# Patient Record
Sex: FEMALE | Race: WHITE | Employment: FULL TIME | URBAN - METROPOLITAN AREA
[De-identification: names, ages, dates, MRNs, and addresses within clinical notes are randomized per-mention and may not be internally consistent; named-entity substitution may affect disease eponyms.]

---

## 2017-01-27 ENCOUNTER — TELEPHONE (OUTPATIENT)
Dept: FAMILY MEDICINE CLINIC | Age: 42
End: 2017-01-27

## 2017-01-27 RX ORDER — OSELTAMIVIR PHOSPHATE 75 MG/1
75 CAPSULE ORAL DAILY
Qty: 10 CAPSULE | Refills: 0 | Status: SHIPPED | OUTPATIENT
Start: 2017-01-27 | End: 2017-02-06

## 2017-03-02 ENCOUNTER — TELEPHONE (OUTPATIENT)
Dept: FAMILY MEDICINE CLINIC | Age: 42
End: 2017-03-02

## 2017-03-02 DIAGNOSIS — Z00.00 WELL ADULT ON ROUTINE HEALTH CHECK: Primary | ICD-10-CM

## 2017-03-04 LAB
ALBUMIN SERPL-MCNC: 4.4 G/DL (ref 3.2–5.3)
ALK PHOSPHATASE: 87 IU/L (ref 35–121)
ALT SERPL-CCNC: 23 IU/L (ref 5–59)
ANION GAP SERPL CALCULATED.3IONS-SCNC: 11 MMOL/L
AST SERPL-CCNC: 21 IU/L (ref 10–42)
BILIRUB SERPL-MCNC: 0.5 MG/DL (ref 0.2–1.3)
BUN BLDV-MCNC: 9 MG/DL (ref 10–20)
CALCIUM SERPL-MCNC: 9.8 MG/DL (ref 8.7–10.8)
CHLORIDE BLD-SCNC: 99 MMOL/L (ref 95–111)
CHOLESTEROL/HDL RATIO: 3.3
CHOLESTEROL: 193 MG/DL
CO2: 29 MMOL/L (ref 21–32)
CREAT SERPL-MCNC: 0.6 MG/DL (ref 0.5–1.3)
EGFR AFRICAN AMERICAN: 135
EGFR IF NONAFRICAN AMERICAN: 111
GLUCOSE: 86 MG/DL (ref 70–100)
HDLC SERPL-MCNC: 58 MG/DL (ref 40–60)
LDL CHOLESTEROL CALCULATED: 115 MG/DL
LDL/HDL RATIO: 2
POTASSIUM SERPL-SCNC: 3.9 MMOL/L (ref 3.5–5.4)
SODIUM BLD-SCNC: 135 MMOL/L (ref 134–147)
T4 FREE: 1.08 NG/DL (ref 0.8–1.8)
TOTAL PROTEIN: 7.1 G/DL (ref 5.8–8)
TRIGL SERPL-MCNC: 98 MG/DL
TSH SERPL DL<=0.05 MIU/L-ACNC: 0.99 UIU/ML (ref 0.4–4.4)
VLDLC SERPL CALC-MCNC: 20 MG/DL

## 2017-03-30 ENCOUNTER — OFFICE VISIT (OUTPATIENT)
Dept: FAMILY MEDICINE CLINIC | Age: 42
End: 2017-03-30

## 2017-03-30 VITALS
HEIGHT: 68 IN | WEIGHT: 293 LBS | TEMPERATURE: 98 F | SYSTOLIC BLOOD PRESSURE: 128 MMHG | DIASTOLIC BLOOD PRESSURE: 76 MMHG | HEART RATE: 92 BPM | BODY MASS INDEX: 44.41 KG/M2 | RESPIRATION RATE: 18 BRPM

## 2017-03-30 DIAGNOSIS — H66.92 LEFT OTITIS MEDIA, UNSPECIFIED CHRONICITY, UNSPECIFIED OTITIS MEDIA TYPE: Primary | ICD-10-CM

## 2017-03-30 DIAGNOSIS — H92.02 OTALGIA, LEFT: ICD-10-CM

## 2017-03-30 DIAGNOSIS — J01.40 ACUTE NON-RECURRENT PANSINUSITIS: ICD-10-CM

## 2017-03-30 DIAGNOSIS — F17.200 TOBACCO DEPENDENCE: ICD-10-CM

## 2017-03-30 PROCEDURE — 99213 OFFICE O/P EST LOW 20 MIN: CPT | Performed by: NURSE PRACTITIONER

## 2017-03-30 RX ORDER — OFLOXACIN 3 MG/ML
5 SOLUTION AURICULAR (OTIC) 2 TIMES DAILY
Qty: 1 BOTTLE | Refills: 0 | Status: SHIPPED | OUTPATIENT
Start: 2017-03-30 | End: 2017-04-06

## 2017-03-30 RX ORDER — AZITHROMYCIN 250 MG/1
TABLET, FILM COATED ORAL
Qty: 6 TABLET | Refills: 0 | Status: SHIPPED | OUTPATIENT
Start: 2017-03-30 | End: 2017-04-09

## 2017-03-30 ASSESSMENT — ENCOUNTER SYMPTOMS
TROUBLE SWALLOWING: 0
COUGH: 0
SORE THROAT: 1
PHOTOPHOBIA: 0
CONSTIPATION: 0
WHEEZING: 0
APNEA: 0
VOMITING: 0
ANAL BLEEDING: 0
ABDOMINAL PAIN: 0
SINUS PRESSURE: 0
VOICE CHANGE: 0
BACK PAIN: 0
BLOOD IN STOOL: 0
SHORTNESS OF BREATH: 0
DIARRHEA: 0
NAUSEA: 0
RHINORRHEA: 0
ABDOMINAL DISTENTION: 0

## 2017-05-05 ENCOUNTER — TELEPHONE (OUTPATIENT)
Dept: FAMILY MEDICINE CLINIC | Age: 42
End: 2017-05-05

## 2017-05-05 DIAGNOSIS — H92.02 OTALGIA, LEFT: Primary | ICD-10-CM

## 2017-05-05 RX ORDER — AMOXICILLIN 500 MG/1
CAPSULE ORAL
Qty: 42 CAPSULE | Refills: 0 | Status: SHIPPED | OUTPATIENT
Start: 2017-05-05 | End: 2017-05-30 | Stop reason: ALTCHOICE

## 2017-05-08 ENCOUNTER — TELEPHONE (OUTPATIENT)
Dept: FAMILY MEDICINE CLINIC | Age: 42
End: 2017-05-08

## 2017-05-08 DIAGNOSIS — H92.02 EAR PAIN, LEFT: Primary | ICD-10-CM

## 2017-05-09 ENCOUNTER — OFFICE VISIT (OUTPATIENT)
Dept: OTOLARYNGOLOGY | Age: 42
End: 2017-05-09

## 2017-05-09 VITALS
WEIGHT: 282.5 LBS | RESPIRATION RATE: 16 BRPM | HEIGHT: 68 IN | TEMPERATURE: 97.6 F | BODY MASS INDEX: 42.81 KG/M2 | SYSTOLIC BLOOD PRESSURE: 110 MMHG | DIASTOLIC BLOOD PRESSURE: 80 MMHG | HEART RATE: 76 BPM

## 2017-05-09 DIAGNOSIS — S03.00XA TMJ (DISLOCATION OF TEMPOROMANDIBULAR JOINT), INITIAL ENCOUNTER: Primary | ICD-10-CM

## 2017-05-09 DIAGNOSIS — H92.02 OTALGIA OF LEFT EAR: ICD-10-CM

## 2017-05-09 PROCEDURE — 99203 OFFICE O/P NEW LOW 30 MIN: CPT | Performed by: PHYSICIAN ASSISTANT

## 2017-05-09 RX ORDER — IBUPROFEN 800 MG/1
800 TABLET ORAL EVERY 6 HOURS PRN
Qty: 120 TABLET | Refills: 3 | Status: SHIPPED | OUTPATIENT
Start: 2017-05-09 | End: 2017-10-01 | Stop reason: ALTCHOICE

## 2017-05-09 ASSESSMENT — ENCOUNTER SYMPTOMS
COLOR CHANGE: 0
SORE THROAT: 0
VOICE CHANGE: 0
APNEA: 0
BACK PAIN: 0
COUGH: 0
EYE DISCHARGE: 0
NAUSEA: 0
CHEST TIGHTNESS: 0
FACIAL SWELLING: 0
EYE ITCHING: 0
BLOOD IN STOOL: 0
WHEEZING: 0
RECTAL PAIN: 0
CONSTIPATION: 0
ABDOMINAL DISTENTION: 0
VOMITING: 0
DIARRHEA: 0
SHORTNESS OF BREATH: 0
EYE PAIN: 0
EYE REDNESS: 0
CHOKING: 0
RHINORRHEA: 0
SINUS PRESSURE: 0
STRIDOR: 0
ABDOMINAL PAIN: 0
ANAL BLEEDING: 0
TROUBLE SWALLOWING: 0
PHOTOPHOBIA: 0

## 2017-05-30 ENCOUNTER — OFFICE VISIT (OUTPATIENT)
Dept: OTOLARYNGOLOGY | Age: 42
End: 2017-05-30

## 2017-05-30 VITALS
SYSTOLIC BLOOD PRESSURE: 120 MMHG | DIASTOLIC BLOOD PRESSURE: 80 MMHG | RESPIRATION RATE: 16 BRPM | WEIGHT: 282.4 LBS | BODY MASS INDEX: 42.8 KG/M2 | TEMPERATURE: 98.5 F | HEIGHT: 68 IN | HEART RATE: 80 BPM

## 2017-05-30 DIAGNOSIS — H93.8X3 EAR FULLNESS, BILATERAL: Primary | ICD-10-CM

## 2017-05-30 DIAGNOSIS — H93.13 TINNITUS, BILATERAL: ICD-10-CM

## 2017-05-30 DIAGNOSIS — R09.81 NASAL CONGESTION: ICD-10-CM

## 2017-05-30 DIAGNOSIS — S03.00XA TMJ (DISLOCATION OF TEMPOROMANDIBULAR JOINT), INITIAL ENCOUNTER: ICD-10-CM

## 2017-05-30 DIAGNOSIS — J01.00 ACUTE NON-RECURRENT MAXILLARY SINUSITIS: ICD-10-CM

## 2017-05-30 DIAGNOSIS — H92.02 OTALGIA OF LEFT EAR: ICD-10-CM

## 2017-05-30 PROCEDURE — 99214 OFFICE O/P EST MOD 30 MIN: CPT | Performed by: PHYSICIAN ASSISTANT

## 2017-05-30 RX ORDER — FLUTICASONE PROPIONATE 50 MCG
1 SPRAY, SUSPENSION (ML) NASAL DAILY
Qty: 1 BOTTLE | Refills: 3 | Status: SHIPPED | OUTPATIENT
Start: 2017-05-30 | End: 2018-02-02

## 2017-05-30 RX ORDER — AMOXICILLIN AND CLAVULANATE POTASSIUM 875; 125 MG/1; MG/1
1 TABLET, FILM COATED ORAL 2 TIMES DAILY
Qty: 56 TABLET | Refills: 0 | Status: SHIPPED | OUTPATIENT
Start: 2017-05-30 | End: 2017-06-26 | Stop reason: ALTCHOICE

## 2017-05-30 RX ORDER — LORATADINE 10 MG/1
10 TABLET ORAL DAILY
Qty: 1 TABLET | Refills: 3 | Status: SHIPPED | OUTPATIENT
Start: 2017-05-30 | End: 2019-08-26 | Stop reason: SDUPTHER

## 2017-05-30 ASSESSMENT — ENCOUNTER SYMPTOMS
CHOKING: 0
SHORTNESS OF BREATH: 0
ABDOMINAL PAIN: 0
WHEEZING: 0
SINUS PRESSURE: 1
EYE REDNESS: 0
VOICE CHANGE: 0
SORE THROAT: 0
EYE PAIN: 0
COLOR CHANGE: 0
CHEST TIGHTNESS: 0
TROUBLE SWALLOWING: 0
COUGH: 0
RHINORRHEA: 0
CONSTIPATION: 0
VOMITING: 0
BLOOD IN STOOL: 0
STRIDOR: 0
DIARRHEA: 0
EYE DISCHARGE: 0
EYE ITCHING: 0
ANAL BLEEDING: 0
ABDOMINAL DISTENTION: 0
NAUSEA: 0
RECTAL PAIN: 0
APNEA: 0
PHOTOPHOBIA: 0
BACK PAIN: 0
FACIAL SWELLING: 0

## 2017-06-16 ENCOUNTER — TELEPHONE (OUTPATIENT)
Dept: FAMILY MEDICINE CLINIC | Age: 42
End: 2017-06-16

## 2017-06-16 RX ORDER — FLUCONAZOLE 150 MG/1
TABLET ORAL
Qty: 2 TABLET | Refills: 0 | Status: SHIPPED | OUTPATIENT
Start: 2017-06-16 | End: 2017-06-17

## 2017-06-26 ENCOUNTER — OFFICE VISIT (OUTPATIENT)
Dept: AUDIOLOGY | Age: 42
End: 2017-06-26

## 2017-06-26 ENCOUNTER — OFFICE VISIT (OUTPATIENT)
Dept: OTOLARYNGOLOGY | Age: 42
End: 2017-06-26

## 2017-06-26 VITALS
DIASTOLIC BLOOD PRESSURE: 86 MMHG | TEMPERATURE: 98 F | WEIGHT: 284.9 LBS | HEART RATE: 72 BPM | BODY MASS INDEX: 43.18 KG/M2 | RESPIRATION RATE: 16 BRPM | SYSTOLIC BLOOD PRESSURE: 122 MMHG | HEIGHT: 68 IN

## 2017-06-26 DIAGNOSIS — H93.8X3 EAR FULLNESS, BILATERAL: Primary | ICD-10-CM

## 2017-06-26 DIAGNOSIS — J30.1 SEASONAL ALLERGIC RHINITIS DUE TO POLLEN: Primary | ICD-10-CM

## 2017-06-26 PROCEDURE — 99213 OFFICE O/P EST LOW 20 MIN: CPT | Performed by: NURSE PRACTITIONER

## 2017-06-26 RX ORDER — AZELASTINE HYDROCHLORIDE, FLUTICASONE PROPIONATE 137; 50 UG/1; UG/1
1 SPRAY, METERED NASAL 2 TIMES DAILY
Qty: 1 BOTTLE | Refills: 5 | COMMUNITY
Start: 2017-06-26 | End: 2018-02-02

## 2017-06-26 ASSESSMENT — ENCOUNTER SYMPTOMS
DIARRHEA: 0
ANAL BLEEDING: 0
WHEEZING: 0
ABDOMINAL DISTENTION: 0
CHEST TIGHTNESS: 0
TROUBLE SWALLOWING: 0
SHORTNESS OF BREATH: 0
COUGH: 0
APNEA: 0
PHOTOPHOBIA: 0
EYE REDNESS: 0
STRIDOR: 0
EYE ITCHING: 0
EYE PAIN: 0
VOICE CHANGE: 0
FACIAL SWELLING: 0
EYE DISCHARGE: 0
BACK PAIN: 0
NAUSEA: 0
SORE THROAT: 0
CHOKING: 0
SINUS PRESSURE: 0
COLOR CHANGE: 0
ABDOMINAL PAIN: 0
BLOOD IN STOOL: 0
RECTAL PAIN: 0
RHINORRHEA: 0
CONSTIPATION: 0
VOMITING: 0

## 2017-07-13 ENCOUNTER — OFFICE VISIT (OUTPATIENT)
Dept: OTOLARYNGOLOGY | Age: 42
End: 2017-07-13

## 2017-07-13 VITALS
RESPIRATION RATE: 18 BRPM | HEART RATE: 84 BPM | TEMPERATURE: 97.7 F | HEIGHT: 68 IN | DIASTOLIC BLOOD PRESSURE: 76 MMHG | BODY MASS INDEX: 43.81 KG/M2 | WEIGHT: 289.1 LBS | SYSTOLIC BLOOD PRESSURE: 118 MMHG

## 2017-07-13 DIAGNOSIS — S03.00XA TMJ (DISLOCATION OF TEMPOROMANDIBULAR JOINT), INITIAL ENCOUNTER: ICD-10-CM

## 2017-07-13 DIAGNOSIS — H61.892 FOREIGN BODY SENSATION IN EAR CANAL, LEFT: Primary | ICD-10-CM

## 2017-07-13 DIAGNOSIS — H93.8X3 EAR FULLNESS, BILATERAL: ICD-10-CM

## 2017-07-13 PROCEDURE — 99213 OFFICE O/P EST LOW 20 MIN: CPT | Performed by: PHYSICIAN ASSISTANT

## 2017-07-13 RX ORDER — AZELASTINE 1 MG/ML
2 SPRAY, METERED NASAL 2 TIMES DAILY
Qty: 1 BOTTLE | Refills: 3 | Status: SHIPPED | OUTPATIENT
Start: 2017-07-13 | End: 2017-10-01

## 2017-07-13 RX ORDER — FLUTICASONE PROPIONATE 50 MCG
1 SPRAY, SUSPENSION (ML) NASAL DAILY
Qty: 1 BOTTLE | Refills: 3 | Status: SHIPPED | OUTPATIENT
Start: 2017-07-13 | End: 2018-02-02

## 2017-07-13 ASSESSMENT — ENCOUNTER SYMPTOMS
VOMITING: 0
RHINORRHEA: 0
SINUS PRESSURE: 0
CONSTIPATION: 0
WHEEZING: 0
BLOOD IN STOOL: 0
CHOKING: 0
EYE DISCHARGE: 0
EYE REDNESS: 0
STRIDOR: 0
EYE ITCHING: 0
TROUBLE SWALLOWING: 0
ANAL BLEEDING: 0
FACIAL SWELLING: 0
NAUSEA: 0
COUGH: 0
SHORTNESS OF BREATH: 0
ABDOMINAL DISTENTION: 0
ABDOMINAL PAIN: 0
COLOR CHANGE: 0
VOICE CHANGE: 0
DIARRHEA: 0
APNEA: 0
PHOTOPHOBIA: 0
RECTAL PAIN: 0
SORE THROAT: 0
BACK PAIN: 0
CHEST TIGHTNESS: 0
EYE PAIN: 0

## 2017-10-01 ENCOUNTER — HOSPITAL ENCOUNTER (EMERGENCY)
Age: 42
Discharge: HOME | End: 2017-10-01
Attending: FAMILY MEDICINE
Payer: COMMERCIAL

## 2017-10-01 ENCOUNTER — HOSPITAL ENCOUNTER (OUTPATIENT)
Dept: INTERVENTIONAL RADIOLOGY/VASCULAR | Age: 42
Discharge: HOME | End: 2017-10-01
Payer: COMMERCIAL

## 2017-10-01 VITALS
WEIGHT: 290 LBS | HEART RATE: 86 BPM | DIASTOLIC BLOOD PRESSURE: 88 MMHG | BODY MASS INDEX: 43.95 KG/M2 | SYSTOLIC BLOOD PRESSURE: 131 MMHG | TEMPERATURE: 97.8 F | RESPIRATION RATE: 18 BRPM | HEIGHT: 68 IN | OXYGEN SATURATION: 94 %

## 2017-10-01 DIAGNOSIS — I80.202: Primary | ICD-10-CM

## 2017-10-01 LAB
ANION GAP: 8 MEQ/L (ref 8–16)
APTT: 27.4 SECONDS (ref 22–38)
BASOPHILS # BLD: 0.9 % (ref 0–3)
BUN BLDV-MCNC: 10 MG/DL (ref 7–18)
CHLORIDE BLD-SCNC: 105 MEQ/L (ref 98–107)
CO2: 26 MEQ/L (ref 21–32)
CREAT SERPL-MCNC: 0.7 MG/DL (ref 0.6–1.1)
EOSINOPHILS RELATIVE PERCENT: 5.8 % (ref 0–4)
GFR, ESTIMATED: > 90 ML/MIN/1.73M2
GLUCOSE BLD-MCNC: 119 MG/DL (ref 74–106)
HCT VFR BLD CALC: 44.4 % (ref 37–47)
HEMOGLOBIN: 14.4 GM/DL (ref 12–16)
INR BLD: 1.04 (ref 0.85–1.13)
LYMPHOCYTES # BLD: 28 % (ref 15–47)
MCH RBC QN AUTO: 29.5 PG (ref 27–31)
MCHC RBC AUTO-ENTMCNC: 32.5 GM/DL (ref 33–37)
MCV RBC AUTO: 90.7 FL (ref 81–99)
MONOCYTES: 7.5 % (ref 0–12)
PDW BLD-RTO: 11.6 % (ref 11.5–14.5)
PLATELET # BLD: 270 THOU/MM3 (ref 130–400)
PMV BLD AUTO: 7.4 MCM (ref 7.4–10.4)
POTASSIUM SERPL-SCNC: 3.8 MEQ/L (ref 3.5–5.1)
RBC # BLD: 4.89 MILL/MM3 (ref 4.2–5.4)
RBC # BLD: NORMAL 10*6/UL
SEGS: 57.8 % (ref 43–75)
SODIUM BLD-SCNC: 139 MEQ/L (ref 136–145)
WBC # BLD: 9.3 THOU/MM3 (ref 4.8–10.8)

## 2017-10-01 PROCEDURE — 99284 EMERGENCY DEPT VISIT MOD MDM: CPT

## 2017-10-01 PROCEDURE — 82947 ASSAY GLUCOSE BLOOD QUANT: CPT

## 2017-10-01 PROCEDURE — 36415 COLL VENOUS BLD VENIPUNCTURE: CPT

## 2017-10-01 PROCEDURE — 82565 ASSAY OF CREATININE: CPT

## 2017-10-01 PROCEDURE — 80051 ELECTROLYTE PANEL: CPT

## 2017-10-01 PROCEDURE — 85610 PROTHROMBIN TIME: CPT

## 2017-10-01 PROCEDURE — 85025 COMPLETE CBC W/AUTO DIFF WBC: CPT

## 2017-10-01 PROCEDURE — 85730 THROMBOPLASTIN TIME PARTIAL: CPT

## 2017-10-01 PROCEDURE — 93971 EXTREMITY STUDY: CPT

## 2017-10-01 PROCEDURE — 84520 ASSAY OF UREA NITROGEN: CPT

## 2017-10-01 RX ORDER — NAPROXEN 500 MG/1
500 TABLET ORAL 2 TIMES DAILY PRN
Qty: 30 TABLET | Refills: 0 | Status: SHIPPED | OUTPATIENT
Start: 2017-10-01 | End: 2018-02-02

## 2017-10-01 ASSESSMENT — ENCOUNTER SYMPTOMS
PHOTOPHOBIA: 0
ABDOMINAL PAIN: 0
NAUSEA: 0
COLOR CHANGE: 0
STRIDOR: 0
FACIAL SWELLING: 0
BACK PAIN: 0
RHINORRHEA: 0
VOICE CHANGE: 0
EYE PAIN: 0
CONSTIPATION: 0
ABDOMINAL DISTENTION: 0
SHORTNESS OF BREATH: 0
SORE THROAT: 0
EYE REDNESS: 0
DIARRHEA: 0
CHEST TIGHTNESS: 0
EYE DISCHARGE: 0
WHEEZING: 0
VOMITING: 0
COUGH: 0

## 2017-10-01 ASSESSMENT — PAIN DESCRIPTION - LOCATION: LOCATION: LEG

## 2017-10-01 ASSESSMENT — PAIN DESCRIPTION - PAIN TYPE: TYPE: ACUTE PAIN

## 2017-10-01 ASSESSMENT — PAIN DESCRIPTION - DESCRIPTORS: DESCRIPTORS: BURNING;CONSTANT

## 2017-10-01 ASSESSMENT — PAIN DESCRIPTION - ORIENTATION: ORIENTATION: LEFT

## 2017-10-01 ASSESSMENT — PAIN SCALES - GENERAL: PAINLEVEL_OUTOF10: 5

## 2017-10-01 NOTE — ED NOTES
[facility]  ED called and notified that the patient will present to the ED to be registered for an outpatient vascular doppler ultrasound.      [address]  10/01/17 [id number]

## 2017-10-01 NOTE — ED AVS SNAPSHOT
After Visit Summary  (Discharge Instructions)    Medication List for Home    Based on the information you provided to us as well as any changes during this visit, the following is your updated medication list.  Compare this with your prescription bottles at home. If you have any questions or concerns, contact your primary care physician's office. Daily Medication List (This medication list can be shared with any Healthcare provider who is helping you manage your medications)      There are NEW medications for you. START taking them after you leave the hospital     naproxen 500 MG tablet   Commonly known as:  NAPROSYN   Take 1 tablet by mouth 2 times daily as needed for Pain         These are medications you told us you were taking at home, CONTINUE taking them after you leave the hospital     CLARITIN-D 12 HOUR PO   Take by mouth daily       FIBERCON PO   Take  by mouth daily. hydrocortisone 0.1 % Crea cream   Commonly known as:  LOCOID   Apply 2-3 x/day as needed to affected areas. MULTIVITAMIN PO   Take  by mouth daily. NONFORMULARY   Relora- 300 mg 1 tablet BID       SILYMARIN PO   Take 150 mg by mouth 2 times daily       spironolactone 100 MG tablet   Commonly known as:  ALDACTONE   Take 100 mg by mouth daily. VITAMIN B COMPLEX PO   Take  by mouth daily.          ASK your doctor about these medications if you have questions     azelastine 0.1 % nasal spray   Commonly known as:  ASTELIN   2 sprays by Nasal route 2 times daily Use in each nostril as directed       Azelastine-Fluticasone 137-50 MCG/ACT Susp   Commonly known as:  DYMISTA   1 spray by Nasal route 2 times daily       * fluticasone 50 MCG/ACT nasal spray   Commonly known as:  FLONASE   1 spray by Nasal route daily       * fluticasone 50 MCG/ACT nasal spray   Commonly known as:  FLONASE   1 spray by Nasal route daily       loratadine 10 MG tablet   Commonly known as:  CLARITIN   Take 1 tablet by mouth daily * Notice: This list has 2 medication(s) that are the same as other medications prescribed for you. Read the directions carefully, and ask your doctor or other care provider to review them with you. Where to Get Your Medications      You can get these medications from any pharmacy     Bring a paper prescription for each of these medications     naproxen 500 MG tablet               Allergies as of 10/1/2017        Reactions    Ceftin [Cefuroxime Axetil] Rash    HIGH DOSE CEFTIN ONLY    Codeine Nausea And Vomiting      Immunizations as of 10/1/2017     Name Date Dose VIS Date Route    Influenza Virus Vaccine 11/15/2016 0.5 -- --    Influenza Virus Vaccine 9/30/2015 0.5 mL 8/7/2015 Intramuscular    Influenza Virus Vaccine 12/2/2014 -- -- --    External: Patient reported    Comment: given at pharmacy    Influenza Virus Vaccine 12/5/2013 0.5 ML -- Intramuscular    External: Patient reported    Comment: GIVEN AT  [facility]    Influenza Virus Vaccine 11/1/2012 -- -- --    External: Patient reported    Influenza Whole 11/25/2011 -- -- --    External: Patient reported    Influenza, Triv, 3 years and older, IM 11/15/2016 -- -- --    Comment: [facility]    Td 1/1/1995 -- -- --    External: Patient reported    Comment: exact date unknown    Tdap (Boostrix, Adacel) 7/23/2012 0.5 mL 1/24/2012 Intramuscular         After Visit Summary    This summary was created for you. Thank you for entrusting your care to us.   The following information includes details about your hospital/visit stay along with steps you should take to help with your recovery once you leave the hospital.  In this packet, you will find information about the topics listed below:    · Instructions about your medications including a list of your home medications  · A summary of your hospital visit  · Follow-up appointments once you have left the hospital  · Your care plan at home You may receive a survey regarding the care you received during your stay. Your input is valuable to us. We encourage you to complete and return your survey in the envelope provided. We hope you will choose us in the future for your healthcare needs. Patient Information     Patient Name CARMNE Arana 1975      Care Provided at:     Name Address Phone       [address] [phone]            Your Visit    Here you will find information about your visit, including the reason for your visit. Please take this sheet with you when you visit your doctor or other health care provider in the future. It will help determine the best possible medical care for you at that time. If you have any questions once you leave the hospital, please call the department phone number listed below. Diagnoses this visit     Your diagnosis was PHLEBITIS AND THROMBOPHLEBITIS OF DEEP VEIN OF LOWER EXTREMITY, LEFT ([facility] [address] Visit Information     Date of Visit Department Dept Phone    10/1/2017 [facility] [phone]      You were seen by     You were seen by Dee Pelayo MD.       Follow-up Appointments    Below is a list of your follow-up and future appointments. This may not be a complete list as you may have made appointments directly with providers that we are not aware of or your providers may have made some for you. Please call your providers to confirm appointments. It is important to keep your appointments. Please bring your current insurance card, photo ID, co-pay, and all medication bottles to your appointment. If self-pay, payment is expected at the time of service. Follow-up Information     Follow up with Juni Wise MD In 2 days.     Specialty:  Family Medicine    Why:  If symptoms worsen, As needed    Contact information:    [address] [facility] [place] [address] [facility]        Date Due    HIV screening is recommended for all people regardless of risk factors  aged 15-65 years at least once (lifetime) who have never been HIV tested. 5/5/1992    Pap Smear 2/1/2016    Yearly Flu Vaccine (1) 9/1/2017    Cholesterol Screening 3/3/2022    Tetanus Combination Vaccine (3 - Td) 7/23/2022                 Care Plan Once You Return Home    This section includes instructions you will need to follow once you leave the hospital.  Your care team will discuss these with you, so you and those caring for you know how to best care for your health needs at home. This section may also include educational information about certain health topics that may be of help to you. Important Information if you smoke or are exposed to smoking       SMOKING: QUIT SMOKING. THIS IS THE MOST IMPORTANT ACTION YOU CAN TAKE TO IMPROVE YOUR CURRENT AND FUTURE HEALTH. Call the [facility] [place] NOW ([phone])    Smoking harms nonsmokers. When nonsmokers are around people who smoke, they absorb nicotine, carbon monoxide, and other ingredients of tobacco smoke. DO NOT SMOKE AROUND CHILDREN     Children exposed to secondhand smoke are at an increased risk of:  Sudden Infant Death Syndrome (SIDS), acute respiratory infections, inflammation of the middle ear, and severe asthma. Over a longer time, it causes heart disease and lung cancer. There is no safe level of exposure to secondhand smoke. Important information for a smoker       SMOKING: QUIT SMOKING. THIS IS THE MOST IMPORTANT ACTION YOU CAN TAKE TO IMPROVE YOUR CURRENT AND FUTURE HEALTH. Call the [facility] [place] NOW ([phone])    Smoking harms nonsmokers. When nonsmokers are around people who smoke, they absorb nicotine, carbon monoxide, and other ingredients of tobacco smoke.      DO NOT SMOKE AROUND CHILDREN Children exposed to secondhand smoke are at an increased risk of:  Sudden Infant Death Syndrome (SIDS), acute respiratory infections, inflammation of the middle ear, and severe asthma. Over a longer time, it causes heart disease and lung cancer. There is no safe level of exposure to secondhand smoke. MyChart Signup     Our records indicate that you have an active [employer] account. You can view your After Visit Summary by going to [email or web address] and logging in with your [employer] username and password. If you don't have a [employer] username and password but a parent or guardian has access to your record, the parent or guardian should login with their own [employer] username and password and access your record to view the After Visit Summary. Additional Information  If you have questions, please contact the physician practice where you receive care. Remember, D[employer] is NOT to be used for urgent needs. For medical emergencies, dial 911. For questions regarding your MyChart account call [phone]. If you have a clinical question, please call your doctor's office. View your information online  ? Review your current list of  medications, immunization, and allergies. ? Review your future test results online . ? Review your discharge instructions provided by your caregivers at discharge    Certain functionality such as prescription refills, scheduling appointments or sending messages to your provider are not activated if your provider does not use [employer] in his/her office    For questions regarding your MyChart account call [phone]. If you have a clinical question, please call your doctor's office. The information on all pages of the After Visit Summary has been reviewed with me, the patient and/or responsible adult, by my health care provider(s).  I had the opportunity to ask questions regarding this may cause increased clotting include:  · Having certain blood problems that make blood clot too easily. This is a problem that may run in families. · Having certain health problems, such as cancer, heart failure, stroke, or severe infection. · Being pregnant. A woman's risk of getting blood clots increases both during pregnancy and shortly after delivery or after a  section. · Using hormonal forms of birth control or hormone therapy. · Smoking. Injury to the blood vessel wall  Blood is more likely to clot in veins and arteries shortly after they are injured. Injury can be caused by a recent medical procedure or surgery that involved your legs, hips, belly, or brain. Or it can be caused by an injury, such as a broken hip. What can you do to prevent blood clots? After any procedure or event that increases your risk  · Take a blood-thinning medicine (called an anticoagulant) as directed if your doctor prescribes one. · Exercise your lower leg muscles to help keep the blood moving through your legs. Point your toes up toward your head so the calves of your legs are stretched, then relax. Repeat. This is a good exercise to do when you are sitting for long periods of time. · Get up out of bed as soon as you safely can or as soon as your doctor says it's okay after an illness or surgery. If you can't get out of bed, you can do the leg exercise described above. Try to do this leg exercise every hour when you are awake. This will help keep the blood moving through your legs. If you are in the hospital and need to stay in bed, your doctor may have you use a special device that inflates and deflates knee-high boots to help keep blood from pooling in your legs. · Use compression stockings if your doctor prescribes them. These are specially fitted stockings that may prevent blood clots by keeping blood from pooling in your legs.   When you travel license by [facility] ([facility]). If you have questions about a medical condition or this instruction, always ask your healthcare professional. [place] [address] any warranty or liability for your use of this information.

## 2017-10-01 NOTE — ED TRIAGE NOTES
Patient presents to the ED with complaints of left upper leg pain. Patient states that the pain started yesterday and she did not think anything of it. Patient denies any known trauma to the leg. States that the pain starts \"in a vein in the upper leg and radiates down the leg to the knee. 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 Patient denies SOB and chest pain at this time. Denies other complaints.

## 2017-10-01 NOTE — ED NOTES
Discharge teaching and instructions for condition explained to patient. AVS reviewed. Printed prescriptions given to patient. Patient voiced understanding regarding prescriptions, follow up appointments and care of self at home. Pt discharged to home in stable condition per self.        Iris Cole RN  10/01/17 4640

## 2017-10-01 NOTE — ED PROVIDER NOTES
[address] [employer]       Chief Complaint   Patient presents with    Leg Pain       Nurses Notes reviewed and I agree except as noted in the HPI. HISTORY OF PRESENT ILLNESS    Margaret Beebe is a 38 y.o. female who presents With left upper leg pain. Patient describes pain, and burning of the left upper leg. Patient does have some family history of blood clots and is concerned about the possibility of a leg blood clot. The patient denies any form of trauma. Patient is not on birth control pills. Patient does not have a history of hypercoagulability. She denies any alleviating measures. Pain is mild-to-moderate in intensity. REVIEW OF SYSTEMS     Review of Systems   Constitutional: Negative for appetite change, chills, diaphoresis and fever (Non toxic appearence). HENT: Negative for congestion, dental problem, ear pain, facial swelling, rhinorrhea, sore throat, tinnitus and voice change. Eyes: Negative for photophobia, pain, discharge and redness. Respiratory: Negative for cough, chest tightness, shortness of breath, wheezing and stridor. Cardiovascular: Negative for chest pain, palpitations and leg swelling. Gastrointestinal: Negative for abdominal distention, abdominal pain, constipation, diarrhea, nausea and vomiting. Genitourinary: Negative for urgency, vaginal bleeding and vaginal discharge. Musculoskeletal: Positive for myalgias (Left upper leg pain). Negative for arthralgias, back pain, joint swelling and neck stiffness. Skin: Negative for color change and rash. Neurological: Negative for dizziness, tremors, seizures, syncope, weakness, numbness and headaches. Psychiatric/Behavioral: Negative for agitation, hallucinations, sleep disturbance and suicidal ideas. The patient is not nervous/anxious. All other systems reviewed and are negative. PAST MEDICAL HISTORY    has a past medical history of Hypertension.     SURGICAL HISTORY has a past surgical history that includes Endometrial ablation (2007); Tubal ligation (2007); Dilation and curettage of uterus; Knee cartilage surgery (Right, 2/25/14); other surgical history (11/17/2016); and Hysterectomy. CURRENT MEDICATIONS       Previous Medications    AZELASTINE-FLUTICASONE (DYMISTA) 137-50 MCG/ACT SUSP    1 spray by Nasal route 2 times daily    B COMPLEX VITAMINS (VITAMIN B COMPLEX PO)    Take  by mouth daily. CALCIUM POLYCARBOPHIL (FIBERCON PO)    Take  by mouth daily. FLUTICASONE (FLONASE) 50 MCG/ACT NASAL SPRAY    1 spray by Nasal route daily    FLUTICASONE (FLONASE) 50 MCG/ACT NASAL SPRAY    1 spray by Nasal route daily    HYDROCORTISONE BUTYR LIPO BASE 0.1 % CREA    Apply 2-3 x/day as needed to affected areas. LORATADINE (CLARITIN) 10 MG TABLET    Take 1 tablet by mouth daily    LORATADINE-PSEUDOEPHEDRINE (CLARITIN-D 12 HOUR PO)      Take by mouth daily     MILK THISTLE-TURMERIC (SILYMARIN PO)    Take 150 mg by mouth 2 times daily    MULTIPLE VITAMIN (MULTIVITAMIN PO)    Take  by mouth daily. NONFORMULARY    Relora- 300 mg 1 tablet BID    SPIRONOLACTONE (ALDACTONE) 100 MG TABLET    Take 100 mg by mouth daily. ALLERGIES     is allergic to ceftin [cefuroxime axetil] and codeine. FAMILY HISTORY     indicated that her mother is alive. She indicated that the status of her father is unknown. She indicated that the status of her maternal grandmother is unknown. She indicated that the status of her maternal grandfather is unknown. She indicated that the status of her other is unknown. She indicated that the status of her neg hx is unknown.  family history includes Cancer in her maternal grandmother; Depression in her mother; Diabetes in her maternal grandfather and another family member; Heart Disease in her mother; High Blood Pressure in her mother; High Cholesterol in her father. There is no history of Stroke.     SOCIAL HISTORY      reports that she has been smoking Cigarettes. She has a 1.00 pack-year smoking history. She has never used smokeless tobacco. She reports that she does not drink alcohol or use illicit drugs. PHYSICAL EXAM     INITIAL VITALS:  height is 5' 8\" (1.727 m) and weight is 290 lb (131.5 kg). Her temperature is 97.8 °F (36.6 °C). Her blood pressure is 131/88 and her pulse is 86. Her respiration is 18 and oxygen saturation is 94%. Physical Exam   Constitutional: She appears well-developed and well-nourished. No distress. Musculoskeletal: Normal range of motion. She exhibits tenderness (Patient demonstrates tenderness to the mid thigh area. there is some tortuosity of the left thigh.). Skin: Skin is dry. No rash noted. Psychiatric: She has a normal mood and affect. Nursing note and vitals reviewed. DIFFERENTIAL DIAGNOSIS:   Phlebitis, DVT, muscle strain,    DIAGNOSTIC RESULTS     EKG: All EKG's are interpreted by the Emergency Department Physician who either signs or Co-signs this chart in the absence of a cardiologist.      RADIOLOGY: non-plain film images(s) such as CT, Ultrasound and MRI are read by the radiologist.  Plain radiographic images are visualized and preliminarily interpreted by the emergency physician unless otherwise stated below.       VL DUP LOWER EXTREMITY VENOUS LEFT (Final result) Result time: 10/01/17 21:44:14     Final result by Angélica Harkins DO (10/01/17 21:44:14)     Impression:       No evidence of a left lower extremity DVT. **This report has been created using voice recognition software. It may contain minor errors which are inherent in voice recognition technology. **    Final report electronically signed by Dr. Angélica Harkins on 10/1/2017 9:44 PM       Narrative:       PROCEDURE: VL LOWER EXTREMITY VENOUS LEFT    CLINICAL INFORMATION: dvt rule out, . COMPARISON: No prior study.     TECHNIQUE: Venous doppler ultrasound was performed of the bilateral lower extremities using gray scale, color flow

## 2018-02-02 ENCOUNTER — OFFICE VISIT (OUTPATIENT)
Dept: BARIATRICS/WEIGHT MGMT | Age: 43
End: 2018-02-02
Payer: COMMERCIAL

## 2018-02-02 VITALS
HEART RATE: 88 BPM | TEMPERATURE: 98.7 F | HEIGHT: 67 IN | BODY MASS INDEX: 45.99 KG/M2 | WEIGHT: 293 LBS | DIASTOLIC BLOOD PRESSURE: 80 MMHG | SYSTOLIC BLOOD PRESSURE: 138 MMHG | RESPIRATION RATE: 18 BRPM

## 2018-02-02 DIAGNOSIS — I10 HYPERTENSION, UNSPECIFIED TYPE: ICD-10-CM

## 2018-02-02 DIAGNOSIS — E66.01 MORBID OBESITY (HCC): Primary | ICD-10-CM

## 2018-02-02 PROCEDURE — G8427 DOCREV CUR MEDS BY ELIG CLIN: HCPCS | Performed by: SURGERY

## 2018-02-02 PROCEDURE — G8417 CALC BMI ABV UP PARAM F/U: HCPCS | Performed by: SURGERY

## 2018-02-02 PROCEDURE — G8484 FLU IMMUNIZE NO ADMIN: HCPCS | Performed by: SURGERY

## 2018-02-02 PROCEDURE — 1036F TOBACCO NON-USER: CPT | Performed by: SURGERY

## 2018-02-02 PROCEDURE — 99203 OFFICE O/P NEW LOW 30 MIN: CPT | Performed by: SURGERY

## 2018-02-02 ASSESSMENT — ENCOUNTER SYMPTOMS
EYES NEGATIVE: 1
RESPIRATORY NEGATIVE: 1
GASTROINTESTINAL NEGATIVE: 1
ALLERGIC/IMMUNOLOGIC NEGATIVE: 1

## 2018-02-02 NOTE — PROGRESS NOTES
on file     Other Topics Concern    Not on file     Social History Narrative    No narrative on file     /80 (Site: Right Arm, Position: Sitting, Cuff Size: Large Adult)   Pulse 88   Temp 98.7 °F (37.1 °C) (Oral)   Resp 18   Ht 5' 7\" (1.702 m)   Wt (!) 313 lb (142 kg)   BMI 49.02 kg/m²     Body mass index is 49.02 kg/m². waist 52 in neck 15 in    Objective:   Physical Exam   Constitutional: She is oriented to person, place, and time. She appears well-developed and well-nourished. She is active and cooperative. Non-toxic appearance. She does not appear ill. No distress. HENT:   Head: Normocephalic and atraumatic. Not macrocephalic and not microcephalic. Head is without raccoon's eyes, without Nieto's sign, without abrasion, without contusion, without laceration, without right periorbital erythema and without left periorbital erythema. Right Ear: External ear normal.   Left Ear: External ear normal.   Nose: Nose normal.   Mouth/Throat: Oropharynx is clear and moist and mucous membranes are normal. No oropharyngeal exudate. Eyes: Conjunctivae and EOM are normal. Pupils are equal, round, and reactive to light. Right eye exhibits no discharge. Left eye exhibits no discharge. No scleral icterus. Neck: Trachea normal, normal range of motion, full passive range of motion without pain and phonation normal. Neck supple. No JVD present. No tracheal tenderness present. No tracheal deviation present. No thyroid mass and no thyromegaly present. Cardiovascular: Normal rate, regular rhythm, S1 normal, S2 normal, normal heart sounds, intact distal pulses and normal pulses. Exam reveals no gallop. No murmur heard. Pulmonary/Chest: Effort normal and breath sounds normal. No stridor. No respiratory distress. She has no decreased breath sounds. She has no wheezes. She has no rales. She exhibits no tenderness and no deformity. Abdominal: Soft.  Bowel sounds are normal. She exhibits no distension, no fluid fitness/exercise discussed with patient and the need for this with/without surgery. 6.  Obtain medical necessity letter from PCP as needed. 7.  Follow-up in one month at weight management program at [facility]. 8.  Signs and symptoms reviewed with patient that would be concerning and need her to return to office for re-evaluation. Patient states she will call if she has questions or concerns. 9. Multivitamin  10. Psychology evaluation  11. EGD prior to any surgical intervention  12. Encouraged support groups  13. Encouraged naturally slam  15. Discussed the pros and cons with possible side effects weight loss medications. All questions answered. -- Patient states that if she is not able to lose enough adequate excess body weight with medical management only then she would like to proceed with a robotic sleeve gastrectomy. --More than 30 minutes spent with patient today. Greater than 50% of the time was involved with counseling, education and coordinating care.

## 2018-02-20 ENCOUNTER — TELEPHONE (OUTPATIENT)
Dept: BARIATRICS/WEIGHT MGMT | Age: 43
End: 2018-02-20

## 2018-05-25 ENCOUNTER — HOSPITAL ENCOUNTER (EMERGENCY)
Age: 43
Discharge: HOME | End: 2018-05-25
Attending: EMERGENCY MEDICINE
Payer: COMMERCIAL

## 2018-05-25 VITALS
WEIGHT: 293 LBS | TEMPERATURE: 98.1 F | DIASTOLIC BLOOD PRESSURE: 95 MMHG | HEIGHT: 68 IN | RESPIRATION RATE: 18 BRPM | SYSTOLIC BLOOD PRESSURE: 150 MMHG | HEART RATE: 104 BPM | BODY MASS INDEX: 44.41 KG/M2 | OXYGEN SATURATION: 98 %

## 2018-05-25 DIAGNOSIS — M54.2 NECK DISCOMFORT: Primary | ICD-10-CM

## 2018-05-25 PROCEDURE — 99282 EMERGENCY DEPT VISIT SF MDM: CPT

## 2018-05-25 ASSESSMENT — PAIN SCALES - GENERAL: PAINLEVEL_OUTOF10: 6

## 2018-05-25 ASSESSMENT — PAIN DESCRIPTION - ORIENTATION: ORIENTATION: RIGHT

## 2018-05-25 ASSESSMENT — PAIN DESCRIPTION - LOCATION: LOCATION: NECK

## 2018-07-02 ENCOUNTER — APPOINTMENT (OUTPATIENT)
Dept: GENERAL RADIOLOGY | Age: 43
End: 2018-07-02
Payer: COMMERCIAL

## 2018-07-02 ENCOUNTER — APPOINTMENT (OUTPATIENT)
Dept: CT IMAGING | Age: 43
End: 2018-07-02
Payer: COMMERCIAL

## 2018-07-02 ENCOUNTER — HOSPITAL ENCOUNTER (EMERGENCY)
Age: 43
Discharge: HOME | End: 2018-07-02
Attending: FAMILY MEDICINE
Payer: COMMERCIAL

## 2018-07-02 VITALS
WEIGHT: 293 LBS | BODY MASS INDEX: 44.41 KG/M2 | HEIGHT: 68 IN | SYSTOLIC BLOOD PRESSURE: 140 MMHG | RESPIRATION RATE: 16 BRPM | DIASTOLIC BLOOD PRESSURE: 72 MMHG | HEART RATE: 65 BPM | TEMPERATURE: 97.8 F | OXYGEN SATURATION: 98 %

## 2018-07-02 DIAGNOSIS — R51.9 NONINTRACTABLE EPISODIC HEADACHE, UNSPECIFIED HEADACHE TYPE: Primary | ICD-10-CM

## 2018-07-02 DIAGNOSIS — S82.891A CLOSED FRACTURE OF RIGHT ANKLE, INITIAL ENCOUNTER: ICD-10-CM

## 2018-07-02 PROCEDURE — 99284 EMERGENCY DEPT VISIT MOD MDM: CPT

## 2018-07-02 PROCEDURE — 96372 THER/PROPH/DIAG INJ SC/IM: CPT

## 2018-07-02 PROCEDURE — 70450 CT HEAD/BRAIN W/O DYE: CPT

## 2018-07-02 PROCEDURE — 96375 TX/PRO/DX INJ NEW DRUG ADDON: CPT

## 2018-07-02 PROCEDURE — 2580000003 HC RX 258: Performed by: FAMILY MEDICINE

## 2018-07-02 PROCEDURE — 96374 THER/PROPH/DIAG INJ IV PUSH: CPT

## 2018-07-02 PROCEDURE — 6360000002 HC RX W HCPCS: Performed by: FAMILY MEDICINE

## 2018-07-02 PROCEDURE — 73610 X-RAY EXAM OF ANKLE: CPT

## 2018-07-02 RX ORDER — METOCLOPRAMIDE HYDROCHLORIDE 5 MG/ML
10 INJECTION INTRAMUSCULAR; INTRAVENOUS ONCE
Status: COMPLETED | OUTPATIENT
Start: 2018-07-02 | End: 2018-07-02

## 2018-07-02 RX ORDER — KETOROLAC TROMETHAMINE 30 MG/ML
30 INJECTION, SOLUTION INTRAMUSCULAR; INTRAVENOUS ONCE
Status: COMPLETED | OUTPATIENT
Start: 2018-07-02 | End: 2018-07-02

## 2018-07-02 RX ORDER — SUMATRIPTAN 100 MG/1
100 TABLET, FILM COATED ORAL
Qty: 9 TABLET | Refills: 3 | Status: SHIPPED | OUTPATIENT
Start: 2018-07-02 | End: 2019-08-26 | Stop reason: SDUPTHER

## 2018-07-02 RX ORDER — SUMATRIPTAN 6 MG/.5ML
6 INJECTION, SOLUTION SUBCUTANEOUS ONCE
Status: COMPLETED | OUTPATIENT
Start: 2018-07-02 | End: 2018-07-02

## 2018-07-02 RX ORDER — NAPROXEN 500 MG/1
500 TABLET ORAL 2 TIMES DAILY
Qty: 60 TABLET | Refills: 0 | Status: SHIPPED | OUTPATIENT
Start: 2018-07-02 | End: 2018-10-01 | Stop reason: ALTCHOICE

## 2018-07-02 RX ORDER — CYCLOBENZAPRINE HCL 10 MG
10 TABLET ORAL 3 TIMES DAILY PRN
Qty: 30 TABLET | Refills: 0 | Status: SHIPPED | OUTPATIENT
Start: 2018-07-02 | End: 2018-07-12

## 2018-07-02 RX ORDER — 0.9 % SODIUM CHLORIDE 0.9 %
1000 INTRAVENOUS SOLUTION INTRAVENOUS ONCE
Status: COMPLETED | OUTPATIENT
Start: 2018-07-02 | End: 2018-07-02

## 2018-07-02 RX ADMIN — SODIUM CHLORIDE 1000 ML: 9 INJECTION, SOLUTION INTRAVENOUS at 12:08

## 2018-07-02 RX ADMIN — KETOROLAC TROMETHAMINE 30 MG: 30 INJECTION, SOLUTION INTRAMUSCULAR at 12:16

## 2018-07-02 RX ADMIN — SUMATRIPTAN 6 MG: 6 INJECTION, SOLUTION SUBCUTANEOUS at 12:15

## 2018-07-02 RX ADMIN — METOCLOPRAMIDE 10 MG: 5 INJECTION, SOLUTION INTRAMUSCULAR; INTRAVENOUS at 12:16

## 2018-07-02 ASSESSMENT — PAIN DESCRIPTION - PAIN TYPE
TYPE: ACUTE PAIN
TYPE: ACUTE PAIN

## 2018-07-02 ASSESSMENT — PAIN DESCRIPTION - LOCATION
LOCATION: HEAD;ANKLE
LOCATION: HEAD
LOCATION: HEAD;ANKLE

## 2018-07-02 ASSESSMENT — PAIN DESCRIPTION - DESCRIPTORS
DESCRIPTORS: DISCOMFORT;PRESSURE
DESCRIPTORS: ACHING
DESCRIPTORS: ACHING

## 2018-07-02 ASSESSMENT — ENCOUNTER SYMPTOMS
WHEEZING: 0
BACK PAIN: 0
DIARRHEA: 0
RHINORRHEA: 0
NAUSEA: 0
EYE PAIN: 0
SHORTNESS OF BREATH: 0
COUGH: 0
EYE DISCHARGE: 0
VOMITING: 0
SORE THROAT: 0
ABDOMINAL PAIN: 0

## 2018-07-02 ASSESSMENT — PAIN SCALES - GENERAL
PAINLEVEL_OUTOF10: 5
PAINLEVEL_OUTOF10: 3
PAINLEVEL_OUTOF10: 7
PAINLEVEL_OUTOF10: 3

## 2018-07-02 ASSESSMENT — PAIN DESCRIPTION - FREQUENCY: FREQUENCY: CONTINUOUS

## 2018-07-02 ASSESSMENT — PAIN DESCRIPTION - PROGRESSION: CLINICAL_PROGRESSION: GRADUALLY IMPROVING

## 2018-07-02 ASSESSMENT — PAIN DESCRIPTION - ORIENTATION
ORIENTATION: LEFT
ORIENTATION: LEFT

## 2018-07-02 NOTE — ED PROVIDER NOTES
[facility]  eMERGENCY dEPARTMENT eNCOUnter          [address] [facility]       Chief Complaint   Patient presents with    Headache     pain on left side of head x2 weeks.  Ankle Pain     fell injuring left ankle last Wed. Nurses Notes reviewed and I agree except as noted in the HPI. HISTORY OF PRESENT ILLNESS    Thomas Osman is a 41 y.o. female who presents Chief complaint of left-sided headache that has been ongoing for about 2 weeks. She has some sensitivity to sound. No nausea, vomiting, or photophobia. She does not routinely get headaches like this. She is also complaining of some left ankle pain. He states that her ankle about a week ago. REVIEW OF SYSTEMS     Review of Systems   Constitutional: Negative for chills, fatigue and fever. HENT: Negative for ear pain, rhinorrhea and sore throat. Eyes: Negative for pain, discharge and visual disturbance. Respiratory: Negative for cough, shortness of breath and wheezing. Cardiovascular: Negative for chest pain, palpitations and leg swelling. Gastrointestinal: Negative for abdominal pain, diarrhea, nausea and vomiting. Endocrine: Negative for polydipsia and polyuria. Genitourinary: Negative for difficulty urinating, dysuria, pelvic pain and vaginal discharge. Musculoskeletal: Positive for joint swelling. Negative for arthralgias and back pain. Skin: Negative for rash. Allergic/Immunologic: Negative for environmental allergies. Neurological: Positive for headaches. Negative for dizziness, seizures and syncope. Hematological: Negative for adenopathy. Psychiatric/Behavioral: Negative for dysphoric mood and suicidal ideas. The patient is not nervous/anxious. PAST MEDICAL HISTORY    has a past medical history of Hypertension. SURGICAL HISTORY      has a past surgical history that includes Endometrial ablation (2007); Tubal ligation (2007);  Dilation and curettage of uterus; Knee cartilage surgery (Right, 2/25/14); other surgical history (2016); Hysterectomy; and knee surgery. CURRENT MEDICATIONS       Discharge Medication List as of 2018  1:45 PM      CONTINUE these medications which have NOT CHANGED    Details   loratadine (CLARITIN) 10 MG tablet Take 1 tablet by mouth daily, Disp-1 tablet, R-3Normal      spironolactone (ALDACTONE) 100 MG tablet Take 100 mg by mouth daily. Calcium Polycarbophil (FIBERCON PO) Take  by mouth daily. Multiple Vitamin (MULTIVITAMIN PO) Take  by mouth daily. B Complex Vitamins (VITAMIN B COMPLEX PO) Take  by mouth daily. ALLERGIES     is allergic to bactrim [sulfamethoxazole-trimethoprim] and codeine. FAMILY HISTORY     indicated that her mother is alive. She indicated that her father is alive. She indicated that her brother is alive. She indicated that her maternal grandmother is . She indicated that her maternal grandfather is . She indicated that her paternal grandmother is . She indicated that her paternal grandfather is . She indicated that the status of her other is unknown. She indicated that the status of her neg hx is unknown.    family history includes Cancer in her maternal grandmother; Depression in her mother; Diabetes in her maternal grandfather, paternal grandfather, and another family member; Heart Disease in her mother; High Blood Pressure in her father and mother; High Cholesterol in her father. SOCIAL HISTORY      reports that she quit smoking about 2 years ago. Her smoking use included Cigarettes. She has a 1.00 pack-year smoking history. She has never used smokeless tobacco. She reports that she does not drink alcohol or use drugs. PHYSICAL EXAM     INITIAL VITALS:  height is 5' 8\" (1.727 m) and weight is 322 lb (146.1 kg) (abnormal). Her oral temperature is 97.8 °F (36.6 °C). Her blood pressure is 140/72 (abnormal) and her pulse is 65. Her respiration is 16 and oxygen saturation is 98%.

## 2018-07-02 NOTE — ED TRIAGE NOTES
Last Wed fell injuring left inner and outer ankle. No edema or bruising at this time. Pain on left side of head x2 weeks. Saw a chiropractor and got a massage with no relief.

## 2018-07-06 ENCOUNTER — TELEPHONE (OUTPATIENT)
Dept: FAMILY MEDICINE CLINIC | Age: 43
End: 2018-07-06

## 2018-07-06 ENCOUNTER — OFFICE VISIT (OUTPATIENT)
Dept: FAMILY MEDICINE CLINIC | Age: 43
End: 2018-07-06
Payer: COMMERCIAL

## 2018-07-06 VITALS
BODY MASS INDEX: 48.5 KG/M2 | RESPIRATION RATE: 16 BRPM | WEIGHT: 293 LBS | SYSTOLIC BLOOD PRESSURE: 124 MMHG | DIASTOLIC BLOOD PRESSURE: 76 MMHG | TEMPERATURE: 97.6 F | HEART RATE: 84 BPM

## 2018-07-06 DIAGNOSIS — J06.9 URI WITH COUGH AND CONGESTION: Primary | ICD-10-CM

## 2018-07-06 DIAGNOSIS — R22.9 SOFT TISSUE SWELLING: Primary | ICD-10-CM

## 2018-07-06 DIAGNOSIS — M25.572 ACUTE LEFT ANKLE PAIN: ICD-10-CM

## 2018-07-06 PROCEDURE — G8427 DOCREV CUR MEDS BY ELIG CLIN: HCPCS | Performed by: NURSE PRACTITIONER

## 2018-07-06 PROCEDURE — 99213 OFFICE O/P EST LOW 20 MIN: CPT | Performed by: NURSE PRACTITIONER

## 2018-07-06 PROCEDURE — 1036F TOBACCO NON-USER: CPT | Performed by: NURSE PRACTITIONER

## 2018-07-06 PROCEDURE — G8417 CALC BMI ABV UP PARAM F/U: HCPCS | Performed by: NURSE PRACTITIONER

## 2018-07-06 RX ORDER — AZITHROMYCIN 250 MG/1
TABLET, FILM COATED ORAL
Qty: 6 TABLET | Refills: 0 | Status: SHIPPED | OUTPATIENT
Start: 2018-07-06 | End: 2018-10-01 | Stop reason: ALTCHOICE

## 2018-07-06 ASSESSMENT — ENCOUNTER SYMPTOMS
NAUSEA: 0
SWOLLEN GLANDS: 0
CONSTIPATION: 0
SINUS PRESSURE: 1
HOARSE VOICE: 0
SHORTNESS OF BREATH: 0
DIARRHEA: 0
WHEEZING: 0
ABDOMINAL PAIN: 0
COUGH: 1
SORE THROAT: 1

## 2018-07-06 ASSESSMENT — PATIENT HEALTH QUESTIONNAIRE - PHQ9
1. LITTLE INTEREST OR PLEASURE IN DOING THINGS: 0
2. FEELING DOWN, DEPRESSED OR HOPELESS: 0
SUM OF ALL RESPONSES TO PHQ QUESTIONS 1-9: 0
SUM OF ALL RESPONSES TO PHQ9 QUESTIONS 1 & 2: 0

## 2018-07-06 NOTE — TELEPHONE ENCOUNTER
Pt left message at 826am stating she has started with a horrible sore throat late Tuesday, last night noticed while spots on back of throat, feels like razors when swallowing, left ear pain (same side as her headache that she went to [facility] for Monday). Very tired. No fever. Has had strep a few years ago and it feels like the same sx's. appt or call in rx? Of note, at [facility] (s/p fall) she was told has small fx foot. Using ace wrap. Was given Imitrex for headache and pain med for foot.   [phone]

## 2018-07-06 NOTE — PATIENT INSTRUCTIONS
Patient Education        Upper Respiratory Infection (Cold): Care Instructions  Your Care Instructions    An upper respiratory infection, or URI, is an infection of the nose, sinuses, or throat. URIs are spread by coughs, sneezes, and direct contact. The common cold is the most frequent kind of URI. The flu and sinus infections are other kinds of URIs. Almost all URIs are caused by viruses. Antibiotics won't cure them. But you can treat most infections with home care. This may include drinking lots of fluids and taking over-the-counter pain medicine. You will probably feel better in 4 to 10 days. The doctor has checked you carefully, but problems can develop later. If you notice any problems or new symptoms, get medical treatment right away. Follow-up care is a key part of your treatment and safety. Be sure to make and go to all appointments, and call your doctor if you are having problems. It's also a good idea to know your test results and keep a list of the medicines you take. How can you care for yourself at home? · To prevent dehydration, drink plenty of fluids, enough so that your urine is light yellow or clear like water. Choose water and other caffeine-free clear liquids until you feel better. If you have kidney, heart, or liver disease and have to limit fluids, talk with your doctor before you increase the amount of fluids you drink. · Take an over-the-counter pain medicine, such as acetaminophen (Tylenol), ibuprofen (Advil, Motrin), or naproxen (Aleve). Read and follow all instructions on the label. · Before you use cough and cold medicines, check the label. These medicines may not be safe for young children or for people with certain health problems. · Be careful when taking over-the-counter cold or flu medicines and Tylenol at the same time. Many of these medicines have acetaminophen, which is Tylenol. Read the labels to make sure that you are not taking more than the recommended dose.  Too much side.    Watch closely for changes in your health, and be sure to contact your doctor if you do not get better as expected. Where can you learn more? Go to [email or web address] and sign in to your [facility] account. Enter Y454 in the Ene[employer] box to learn more about \"Sore Throat: Care Instructions. \"     If you do not have an account, please click on the \"Sign Up Now\" link. Current as of: May 12, 2017  Content Version: 11.6  © 4640-0603 [employer]. Care instructions adapted under license by [facility] ([facility]). If you have questions about a medical condition or this instruction, always ask your healthcare professional. [address] any warranty or liability for your use of this information.

## 2018-07-06 NOTE — PROGRESS NOTES
of Systems   Constitutional: Negative for appetite change, chills, diaphoresis, fatigue and fever. HENT: Positive for congestion, ear pain, sinus pressure and sore throat. Negative for hoarse voice, sneezing and tinnitus. Eyes: Negative for visual disturbance. Respiratory: Positive for cough. Negative for shortness of breath and wheezing. Cardiovascular: Negative for chest pain and leg swelling. Gastrointestinal: Negative for abdominal pain, constipation, diarrhea and nausea. Genitourinary: Negative for frequency. Musculoskeletal: Negative for arthralgias, myalgias and neck stiffness. Skin: Negative for rash. Neurological: Positive for headaches. Negative for dizziness and weakness. Psychiatric/Behavioral: The patient is not nervous/anxious. All other systems reviewed and are negative. Objective:     /76   Pulse 84   Temp 97.6 °F (36.4 °C) (Oral)   Resp 16   Wt (!) 319 lb (144.7 kg)   BMI 48.50 kg/m²     Physical Exam   Constitutional: She is oriented to person, place, and time. She appears well-developed and well-nourished. She is cooperative. She appears ill. No distress. HENT:   Right Ear: Hearing, external ear and ear canal normal. Tympanic membrane is bulging. Left Ear: Hearing, external ear and ear canal normal. Tympanic membrane is bulging. Nose: Mucosal edema and rhinorrhea present. Right sinus exhibits no maxillary sinus tenderness. Left sinus exhibits no maxillary sinus tenderness. Mouth/Throat: Uvula is midline and mucous membranes are normal. No uvula swelling. Posterior oropharyngeal erythema present. No oropharyngeal exudate or posterior oropharyngeal edema. Eyes: Conjunctivae, EOM and lids are normal. Pupils are equal, round, and reactive to light. Right eye exhibits no discharge. Left eye exhibits no discharge. Neck: Full passive range of motion without pain. No muscular tenderness present.    Cardiovascular: Normal rate, regular rhythm and normal heart sounds. Pulmonary/Chest: Effort normal and breath sounds normal. No accessory muscle usage. No respiratory distress. She has no wheezes. She has no rales. Lymphadenopathy:     She has no cervical adenopathy. Neurological: She is alert and oriented to person, place, and time. She has normal strength. No cranial nerve deficit. Coordination and gait normal. GCS eye subscore is 4. GCS verbal subscore is 5. GCS motor subscore is 6. Skin: Skin is warm and dry. She is not diaphoretic. Psychiatric: She has a normal mood and affect. Her speech is normal and behavior is normal. Judgment and thought content normal. Cognition and memory are normal.   Nursing note and vitals reviewed. Assessment/Plan:      Appears ill but nontoxic, afebrile. Skin is warm and dry, respirations easy, lungs are clear. Significant nasal congestion and clear drainage and postnasal drip. TMs are bulging, posterior oropharynx mildly erythematous, no cervical adenopathy. Diagnoses and all orders for this visit:    URI with cough and congestion  -     azithromycin (ZITHROMAX Z-STARLA) 250 MG tablet; 2 tablets day 1 then1 tablet days 2 - 5. Acute streptococcal pharyngitis        Return if symptoms worsen or fail to improve. Patient instructions given and reviewed.         Electronically signed by JASSON Murray CNP on 7/6/2018 at 10:08 AM

## 2018-07-06 NOTE — TELEPHONE ENCOUNTER
Spoke to pt, she was just in office to see TM, she was rx'd z-max. She states she told TM about her foot but he did not look at it. Pt is wearing a brace and staying off of it as much as possible. With her throat and fatigue she has been resting a lot, it is feeling better. ES - do you want a repeat xray first before re-eval here or with ortho?

## 2018-07-10 NOTE — TELEPHONE ENCOUNTER
Pt okay with recheck of XR. She is asking when ES would like this done? Pt states she is doing better but will have some soreness from time to time. Okay to mail XR to pt.

## 2018-07-18 ENCOUNTER — HOSPITAL ENCOUNTER (OUTPATIENT)
Dept: GENERAL RADIOLOGY | Age: 43
Discharge: HOME | End: 2018-07-18
Payer: COMMERCIAL

## 2018-07-18 ENCOUNTER — HOSPITAL ENCOUNTER (OUTPATIENT)
Age: 43
Discharge: HOME | End: 2018-07-18
Payer: COMMERCIAL

## 2018-07-18 DIAGNOSIS — R22.9 SOFT TISSUE SWELLING: ICD-10-CM

## 2018-07-18 DIAGNOSIS — M25.572 ACUTE LEFT ANKLE PAIN: ICD-10-CM

## 2018-07-18 PROCEDURE — 73610 X-RAY EXAM OF ANKLE: CPT

## 2018-08-08 ENCOUNTER — HOSPITAL ENCOUNTER (OUTPATIENT)
Dept: MAMMOGRAPHY | Age: 43
Discharge: HOME | End: 2018-08-08
Payer: COMMERCIAL

## 2018-08-08 DIAGNOSIS — Z12.39 SCREENING BREAST EXAMINATION: ICD-10-CM

## 2018-08-08 LAB
ALBUMIN SERPL-MCNC: 4 G/DL (ref 3.5–5.1)
ALP BLD-CCNC: 87 U/L (ref 38–126)
ALT SERPL-CCNC: 22 U/L (ref 11–66)
ANION GAP SERPL CALCULATED.3IONS-SCNC: 13 MEQ/L (ref 8–16)
AST SERPL-CCNC: 21 U/L (ref 5–40)
BILIRUB SERPL-MCNC: 0.3 MG/DL (ref 0.3–1.2)
BUN BLDV-MCNC: 10 MG/DL (ref 7–22)
CALCIUM SERPL-MCNC: 9.4 MG/DL (ref 8.5–10.5)
CHLORIDE BLD-SCNC: 102 MEQ/L (ref 98–111)
CO2: 24 MEQ/L (ref 23–33)
CREAT SERPL-MCNC: 0.7 MG/DL (ref 0.4–1.2)
ERYTHROCYTE [DISTWIDTH] IN BLOOD BY AUTOMATED COUNT: 13.7 % (ref 11.5–14.5)
ERYTHROCYTE [DISTWIDTH] IN BLOOD BY AUTOMATED COUNT: 45.7 FL (ref 35–45)
GFR SERPL CREATININE-BSD FRML MDRD: > 90 ML/MIN/1.73M2
GLUCOSE BLD-MCNC: 106 MG/DL (ref 70–108)
HCT VFR BLD CALC: 45.5 % (ref 37–47)
HEMOGLOBIN: 14.8 GM/DL (ref 12–16)
MCH RBC QN AUTO: 29.7 PG (ref 26–33)
MCHC RBC AUTO-ENTMCNC: 32.5 GM/DL (ref 32.2–35.5)
MCV RBC AUTO: 91.2 FL (ref 81–99)
PLATELET # BLD: 319 THOU/MM3 (ref 130–400)
PMV BLD AUTO: 10.4 FL (ref 9.4–12.4)
POTASSIUM SERPL-SCNC: 4.5 MEQ/L (ref 3.5–5.2)
RBC # BLD: 4.99 MILL/MM3 (ref 4.2–5.4)
SODIUM BLD-SCNC: 139 MEQ/L (ref 135–145)
TOTAL PROTEIN: 7.3 G/DL (ref 6.1–8)
TSH SERPL DL<=0.05 MIU/L-ACNC: 1.6 UIU/ML (ref 0.4–4.2)
WBC # BLD: 8.6 THOU/MM3 (ref 4.8–10.8)

## 2018-08-08 PROCEDURE — 86038 ANTINUCLEAR ANTIBODIES: CPT

## 2018-08-08 PROCEDURE — 85027 COMPLETE CBC AUTOMATED: CPT

## 2018-08-08 PROCEDURE — 84443 ASSAY THYROID STIM HORMONE: CPT

## 2018-08-08 PROCEDURE — 77063 BREAST TOMOSYNTHESIS BI: CPT

## 2018-08-08 PROCEDURE — 36415 COLL VENOUS BLD VENIPUNCTURE: CPT

## 2018-08-08 PROCEDURE — 80053 COMPREHEN METABOLIC PANEL: CPT

## 2018-08-10 LAB — ANA SCREEN: NORMAL

## 2018-09-27 ENCOUNTER — NURSE TRIAGE (OUTPATIENT)
Dept: ADMINISTRATIVE | Age: 43
End: 2018-09-27

## 2018-09-28 ENCOUNTER — TELEPHONE (OUTPATIENT)
Dept: ENT CLINIC | Age: 43
End: 2018-09-28

## 2018-09-28 NOTE — TELEPHONE ENCOUNTER
\"I had some pain in the L ear since Monday and today there felt like water and I used Qtip and there was bright red blood on it and it has been several times this evening. \"

## 2018-09-28 NOTE — TELEPHONE ENCOUNTER
We can see patient at available appointment, or she can contact PCP. Keep water out of ear until seen.

## 2018-09-28 NOTE — TELEPHONE ENCOUNTER
Patient call back and notified that Lamberto stated we can see her at available appointment, scheduled her for 10/1/18 with Pippa, next Monday morning, or she can contact pcp and keep water out of ear. Patient verbalized understanding and appreciated call.

## 2018-09-28 NOTE — TELEPHONE ENCOUNTER
Patient called and stated that she had ear pain the first of the week but yesterday she had slight bleeding from the ear but late last night she had bright red blood when she cleaned her ear because she could feel it in the ear canal. She stated that she took ibuprofen and used a warm wash cloth. She stated she did call a nurse and they advised her to call us this morning.      We saw patient in the past for ear fullness, last appointment was 7/13/17

## 2018-09-28 NOTE — TELEPHONE ENCOUNTER
Reason for Disposition   Unexplained bleeding from ear    Answer Assessment - Initial Assessment Questions  1. LOCATION: \"Which ear is involved? \"       L ear  2. COLOR: \"What is the color of the discharge? \"       blood  3. CONSISTENCY: \"How runny is the discharge? Could it be water? \"       Not running out of ear  4. ONSET: \"When did you first notice the discharge? \"      This evening  5. PAIN: \"Is there any earache? \" \"How bad is it? \"  (Scale 1-10; or mild, moderate, severe)      Took motrin and warm cloth helped the pain and before that mild pain  6. OBJECTS: Pedro Luis Almazan use of q-tips or have you inserted anything else in your ear? \"      q tip  7. OTHER SYMPTOMS: \"Do you have any other symptoms? \" (e.g., headache, fever, dizziness, vomiting, runny nose)      Denies dizziness but sl ringing in the ear , no muffled hearing  8. PREGNANCY: \"Is there any chance you are pregnant? \" \"When was your last menstrual period? \"      NA- hyster    Protocols used: EAR - DISCHARGE-ADULT-AH

## 2018-10-01 ENCOUNTER — OFFICE VISIT (OUTPATIENT)
Dept: ENT CLINIC | Age: 43
End: 2018-10-01
Payer: COMMERCIAL

## 2018-10-01 VITALS
TEMPERATURE: 98.3 F | BODY MASS INDEX: 47.29 KG/M2 | RESPIRATION RATE: 16 BRPM | HEART RATE: 68 BPM | WEIGHT: 293 LBS | DIASTOLIC BLOOD PRESSURE: 74 MMHG | SYSTOLIC BLOOD PRESSURE: 126 MMHG

## 2018-10-01 DIAGNOSIS — S09.91XA TRAUMA OF EAR CANAL, INITIAL ENCOUNTER: Primary | ICD-10-CM

## 2018-10-01 DIAGNOSIS — J30.2 SEASONAL ALLERGIES: ICD-10-CM

## 2018-10-01 PROCEDURE — 1036F TOBACCO NON-USER: CPT | Performed by: NURSE PRACTITIONER

## 2018-10-01 PROCEDURE — 99213 OFFICE O/P EST LOW 20 MIN: CPT | Performed by: NURSE PRACTITIONER

## 2018-10-01 PROCEDURE — G8417 CALC BMI ABV UP PARAM F/U: HCPCS | Performed by: NURSE PRACTITIONER

## 2018-10-01 PROCEDURE — G8484 FLU IMMUNIZE NO ADMIN: HCPCS | Performed by: NURSE PRACTITIONER

## 2018-10-01 PROCEDURE — G8427 DOCREV CUR MEDS BY ELIG CLIN: HCPCS | Performed by: NURSE PRACTITIONER

## 2018-10-01 RX ORDER — LEVOCETIRIZINE DIHYDROCHLORIDE 5 MG/1
5 TABLET, FILM COATED ORAL NIGHTLY
Qty: 30 TABLET | Refills: 5 | Status: SHIPPED | OUTPATIENT
Start: 2018-10-01 | End: 2019-08-26 | Stop reason: SDUPTHER

## 2018-10-01 ASSESSMENT — ENCOUNTER SYMPTOMS
BLOOD IN STOOL: 0
VOMITING: 0
ANAL BLEEDING: 0
SHORTNESS OF BREATH: 0
BACK PAIN: 0
WHEEZING: 0
NAUSEA: 0
PHOTOPHOBIA: 0
EYE ITCHING: 0
ABDOMINAL DISTENTION: 0
EYE PAIN: 0
STRIDOR: 0
FACIAL SWELLING: 0
CHOKING: 0
CHEST TIGHTNESS: 0
COUGH: 0
EYE REDNESS: 0
EYE DISCHARGE: 0
APNEA: 0
TROUBLE SWALLOWING: 0
COLOR CHANGE: 0
ABDOMINAL PAIN: 0
SORE THROAT: 0
RHINORRHEA: 0
CONSTIPATION: 0
DIARRHEA: 0
RECTAL PAIN: 0
VOICE CHANGE: 0
SINUS PRESSURE: 0

## 2018-12-19 ENCOUNTER — NURSE ONLY (OUTPATIENT)
Dept: FAMILY MEDICINE CLINIC | Age: 43
End: 2018-12-19
Payer: COMMERCIAL

## 2018-12-19 DIAGNOSIS — Z23 NEED FOR INFLUENZA VACCINATION: Primary | ICD-10-CM

## 2018-12-19 PROCEDURE — 90688 IIV4 VACCINE SPLT 0.5 ML IM: CPT | Performed by: FAMILY MEDICINE

## 2018-12-19 PROCEDURE — 90471 IMMUNIZATION ADMIN: CPT | Performed by: FAMILY MEDICINE

## 2019-08-09 DIAGNOSIS — F41.9 ANXIETY: Primary | ICD-10-CM

## 2019-08-09 RX ORDER — HYDROXYZINE HYDROCHLORIDE 10 MG/1
10-20 TABLET, FILM COATED ORAL EVERY 6 HOURS PRN
Qty: 40 TABLET | Refills: 0 | Status: SHIPPED | OUTPATIENT
Start: 2019-08-09 | End: 2020-06-11 | Stop reason: ALTCHOICE

## 2019-08-09 NOTE — TELEPHONE ENCOUNTER
Pt notified. Verbalized an understanding. Encouraged pt to call office back with any further questions. Said she will keep us updated.

## 2019-08-26 ENCOUNTER — OFFICE VISIT (OUTPATIENT)
Dept: FAMILY MEDICINE CLINIC | Age: 44
End: 2019-08-26
Payer: COMMERCIAL

## 2019-08-26 VITALS
DIASTOLIC BLOOD PRESSURE: 70 MMHG | WEIGHT: 293 LBS | RESPIRATION RATE: 18 BRPM | BODY MASS INDEX: 46.8 KG/M2 | TEMPERATURE: 98 F | SYSTOLIC BLOOD PRESSURE: 126 MMHG | HEART RATE: 88 BPM

## 2019-08-26 DIAGNOSIS — R10.11 RIGHT UPPER QUADRANT ABDOMINAL PAIN: Primary | ICD-10-CM

## 2019-08-26 DIAGNOSIS — J30.2 SEASONAL ALLERGIES: ICD-10-CM

## 2019-08-26 PROCEDURE — 99213 OFFICE O/P EST LOW 20 MIN: CPT | Performed by: NURSE PRACTITIONER

## 2019-08-26 PROCEDURE — G8427 DOCREV CUR MEDS BY ELIG CLIN: HCPCS | Performed by: NURSE PRACTITIONER

## 2019-08-26 PROCEDURE — 1036F TOBACCO NON-USER: CPT | Performed by: NURSE PRACTITIONER

## 2019-08-26 PROCEDURE — G8417 CALC BMI ABV UP PARAM F/U: HCPCS | Performed by: NURSE PRACTITIONER

## 2019-08-26 RX ORDER — OMEPRAZOLE 40 MG/1
40 CAPSULE, DELAYED RELEASE ORAL
Qty: 90 CAPSULE | Refills: 3 | Status: SHIPPED | OUTPATIENT
Start: 2019-08-26 | End: 2020-08-31

## 2019-08-26 RX ORDER — LORATADINE 10 MG/1
10 TABLET ORAL DAILY
Qty: 90 TABLET | Refills: 3 | Status: SHIPPED | OUTPATIENT
Start: 2019-08-26 | End: 2020-09-09

## 2019-08-26 RX ORDER — CALCIUM POLYCARBOPHIL 625 MG 625 MG/1
TABLET ORAL
Qty: 360 TABLET | Refills: 3 | COMMUNITY
Start: 2019-08-26 | End: 2020-09-10 | Stop reason: SDUPTHER

## 2019-08-26 RX ORDER — AMOXICILLIN AND CLAVULANATE POTASSIUM 875; 125 MG/1; MG/1
1 TABLET, FILM COATED ORAL 2 TIMES DAILY
Qty: 20 TABLET | Refills: 0 | Status: SHIPPED | OUTPATIENT
Start: 2019-08-26 | End: 2019-09-05

## 2019-08-26 RX ORDER — SUMATRIPTAN 100 MG/1
100 TABLET, FILM COATED ORAL
Qty: 9 TABLET | Refills: 3 | Status: SHIPPED | OUTPATIENT
Start: 2019-08-26 | End: 2020-09-10 | Stop reason: SDUPTHER

## 2019-08-26 RX ORDER — LEVOCETIRIZINE DIHYDROCHLORIDE 5 MG/1
5 TABLET, FILM COATED ORAL NIGHTLY
Qty: 90 TABLET | Refills: 3 | Status: SHIPPED | OUTPATIENT
Start: 2019-08-26 | End: 2020-06-11 | Stop reason: ALTCHOICE

## 2019-08-26 ASSESSMENT — ENCOUNTER SYMPTOMS
NAUSEA: 1
ABDOMINAL PAIN: 1
VOMITING: 1
SHORTNESS OF BREATH: 0
RHINORRHEA: 1
BLOOD IN STOOL: 0
SINUS PRESSURE: 1
DIARRHEA: 0
CONSTIPATION: 0

## 2019-08-26 NOTE — PATIENT INSTRUCTIONS

## 2019-09-06 ENCOUNTER — HOSPITAL ENCOUNTER (OUTPATIENT)
Dept: ULTRASOUND IMAGING | Age: 44
Discharge: HOME | End: 2019-09-06
Payer: COMMERCIAL

## 2019-09-06 ENCOUNTER — HOSPITAL ENCOUNTER (OUTPATIENT)
Age: 44
Discharge: HOME | End: 2019-09-06
Payer: COMMERCIAL

## 2019-09-06 ENCOUNTER — TELEPHONE (OUTPATIENT)
Dept: FAMILY MEDICINE CLINIC | Age: 44
End: 2019-09-06

## 2019-09-06 DIAGNOSIS — R10.11 RIGHT UPPER QUADRANT ABDOMINAL PAIN: ICD-10-CM

## 2019-09-06 DIAGNOSIS — K85.90 ACUTE PANCREATITIS, UNSPECIFIED COMPLICATION STATUS, UNSPECIFIED PANCREATITIS TYPE: Primary | ICD-10-CM

## 2019-09-06 DIAGNOSIS — K85.90 ACUTE PANCREATITIS, UNSPECIFIED COMPLICATION STATUS, UNSPECIFIED PANCREATITIS TYPE: ICD-10-CM

## 2019-09-06 LAB
AMYLASE: 27 U/L (ref 20–104)
BASOPHILS # BLD: 0.7 %
BASOPHILS ABSOLUTE: 0.1 THOU/MM3 (ref 0–0.1)
EOSINOPHIL # BLD: 3.8 %
EOSINOPHILS ABSOLUTE: 0.4 THOU/MM3 (ref 0–0.4)
ERYTHROCYTE [DISTWIDTH] IN BLOOD BY AUTOMATED COUNT: 13.4 % (ref 11.5–14.5)
ERYTHROCYTE [DISTWIDTH] IN BLOOD BY AUTOMATED COUNT: 44.3 FL (ref 35–45)
HCT VFR BLD CALC: 45.1 % (ref 37–47)
HEMOGLOBIN: 14.6 GM/DL (ref 12–16)
IMMATURE GRANS (ABS): 0.04 THOU/MM3 (ref 0–0.07)
IMMATURE GRANULOCYTES: 0 %
LIPASE: 27.2 U/L (ref 5.6–51.3)
LYMPHOCYTES # BLD: 21.9 %
LYMPHOCYTES ABSOLUTE: 2.5 THOU/MM3 (ref 1–4.8)
MCH RBC QN AUTO: 29.4 PG (ref 26–33)
MCHC RBC AUTO-ENTMCNC: 32.4 GM/DL (ref 32.2–35.5)
MCV RBC AUTO: 90.7 FL (ref 81–99)
MONOCYTES # BLD: 7.3 %
MONOCYTES ABSOLUTE: 0.8 THOU/MM3 (ref 0.4–1.3)
NUCLEATED RED BLOOD CELLS: 0 /100 WBC
PLATELET # BLD: 257 THOU/MM3 (ref 130–400)
PMV BLD AUTO: 11 FL (ref 9.4–12.4)
RBC # BLD: 4.97 MILL/MM3 (ref 4.2–5.4)
SEG NEUTROPHILS: 66 %
SEGMENTED NEUTROPHILS ABSOLUTE COUNT: 7.7 THOU/MM3 (ref 1.8–7.7)
WBC # BLD: 11.6 THOU/MM3 (ref 4.8–10.8)

## 2019-09-06 PROCEDURE — 36415 COLL VENOUS BLD VENIPUNCTURE: CPT

## 2019-09-06 PROCEDURE — 82150 ASSAY OF AMYLASE: CPT

## 2019-09-06 PROCEDURE — 83690 ASSAY OF LIPASE: CPT

## 2019-09-06 PROCEDURE — 85025 COMPLETE CBC W/AUTO DIFF WBC: CPT

## 2019-09-06 PROCEDURE — 76705 ECHO EXAM OF ABDOMEN: CPT

## 2019-09-06 NOTE — TELEPHONE ENCOUNTER
----- Message from JASSON Friedman - CNP sent at 9/6/2019 10:35 AM EDT -----  Please let pt know her US GB is normal, but showed possible pancreatitis. Would like to check CBC, amylase, Lipase.  TS

## 2019-09-09 ENCOUNTER — HOSPITAL ENCOUNTER (OUTPATIENT)
Dept: NUCLEAR MEDICINE | Age: 44
Discharge: HOME | End: 2019-09-09
Payer: COMMERCIAL

## 2019-09-09 VITALS — BODY MASS INDEX: 45.77 KG/M2 | WEIGHT: 293 LBS

## 2019-09-09 DIAGNOSIS — R10.11 RIGHT UPPER QUADRANT ABDOMINAL PAIN: ICD-10-CM

## 2019-09-09 PROCEDURE — 3430000000 HC RX DIAGNOSTIC RADIOPHARMACEUTICAL: Performed by: NURSE PRACTITIONER

## 2019-09-09 PROCEDURE — 78227 HEPATOBIL SYST IMAGE W/DRUG: CPT

## 2019-09-09 PROCEDURE — A9537 TC99M MEBROFENIN: HCPCS | Performed by: NURSE PRACTITIONER

## 2019-09-09 PROCEDURE — 2709999900 HC NON-CHARGEABLE SUPPLY

## 2019-09-09 PROCEDURE — 6360000004 HC RX CONTRAST MEDICATION: Performed by: NURSE PRACTITIONER

## 2019-09-09 PROCEDURE — 2580000003 HC RX 258: Performed by: NURSE PRACTITIONER

## 2019-09-09 RX ADMIN — KIT FOR THE PREPARATION OF TECHNETIUM TC 99M MEBROFENIN 8.8 MILLICURIE: 45 INJECTION, POWDER, LYOPHILIZED, FOR SOLUTION INTRAVENOUS at 08:55

## 2019-09-09 RX ADMIN — SODIUM CHLORIDE 2.73 MCG: 9 INJECTION, SOLUTION INTRAVENOUS at 10:08

## 2019-09-10 ENCOUNTER — TELEPHONE (OUTPATIENT)
Dept: FAMILY MEDICINE CLINIC | Age: 44
End: 2019-09-10

## 2019-09-10 RX ORDER — ESCITALOPRAM OXALATE 10 MG/1
10 TABLET ORAL DAILY
Qty: 30 TABLET | Refills: 1 | Status: SHIPPED | OUTPATIENT
Start: 2019-09-10 | End: 2019-10-25 | Stop reason: SDUPTHER

## 2019-09-10 NOTE — TELEPHONE ENCOUNTER
Limit alcohol and fatty foods. Avoid smoking and exercise regularly to promote healthy weight loss. Be sure to stay hydrated. Appt if symptoms worsen. Lexapro sent to pharmacy. Educate patient that it takes 4-6 weeks for full effect of medication, but should have some improvement of symptoms after a few weeks.  TS

## 2019-10-22 ENCOUNTER — HOSPITAL ENCOUNTER (OUTPATIENT)
Dept: MAMMOGRAPHY | Age: 44
Discharge: HOME | End: 2019-10-22
Payer: COMMERCIAL

## 2019-10-22 DIAGNOSIS — Z12.39 SCREENING BREAST EXAMINATION: ICD-10-CM

## 2019-10-22 PROCEDURE — 77063 BREAST TOMOSYNTHESIS BI: CPT

## 2019-10-28 RX ORDER — ESCITALOPRAM OXALATE 10 MG/1
10 TABLET ORAL DAILY
Qty: 30 TABLET | Refills: 1 | Status: SHIPPED | OUTPATIENT
Start: 2019-10-28 | End: 2019-11-25 | Stop reason: SDUPTHER

## 2019-11-25 RX ORDER — ESCITALOPRAM OXALATE 10 MG/1
TABLET ORAL
Qty: 30 TABLET | Refills: 1 | Status: SHIPPED | OUTPATIENT
Start: 2019-11-25 | End: 2019-12-04 | Stop reason: SDUPTHER

## 2019-12-04 RX ORDER — ESCITALOPRAM OXALATE 10 MG/1
TABLET ORAL
Qty: 90 TABLET | Refills: 1 | Status: SHIPPED | OUTPATIENT
Start: 2019-12-04 | End: 2020-06-05

## 2020-02-08 ENCOUNTER — HOSPITAL ENCOUNTER (EMERGENCY)
Age: 45
Discharge: HOME | End: 2020-02-08
Payer: COMMERCIAL

## 2020-02-08 ENCOUNTER — APPOINTMENT (OUTPATIENT)
Dept: GENERAL RADIOLOGY | Age: 45
End: 2020-02-08
Payer: COMMERCIAL

## 2020-02-08 VITALS
TEMPERATURE: 97.9 F | RESPIRATION RATE: 18 BRPM | SYSTOLIC BLOOD PRESSURE: 165 MMHG | HEIGHT: 68 IN | DIASTOLIC BLOOD PRESSURE: 97 MMHG | HEART RATE: 99 BPM | WEIGHT: 287 LBS | OXYGEN SATURATION: 97 % | BODY MASS INDEX: 43.5 KG/M2

## 2020-02-08 PROCEDURE — 73630 X-RAY EXAM OF FOOT: CPT

## 2020-02-08 PROCEDURE — 6370000000 HC RX 637 (ALT 250 FOR IP): Performed by: NURSE PRACTITIONER

## 2020-02-08 PROCEDURE — 99283 EMERGENCY DEPT VISIT LOW MDM: CPT

## 2020-02-08 RX ORDER — NAPROXEN 500 MG/1
500 TABLET ORAL 2 TIMES DAILY
Qty: 60 TABLET | Refills: 0 | Status: SHIPPED | OUTPATIENT
Start: 2020-02-08 | End: 2020-06-11 | Stop reason: ALTCHOICE

## 2020-02-08 RX ORDER — NAPROXEN 250 MG/1
500 TABLET ORAL ONCE
Status: COMPLETED | OUTPATIENT
Start: 2020-02-08 | End: 2020-02-08

## 2020-02-08 RX ADMIN — NAPROXEN 500 MG: 250 TABLET ORAL at 03:56

## 2020-02-08 ASSESSMENT — PAIN SCALES - GENERAL: PAINLEVEL_OUTOF10: 9

## 2020-02-08 ASSESSMENT — PAIN DESCRIPTION - DESCRIPTORS: DESCRIPTORS: SHARP

## 2020-02-08 ASSESSMENT — ENCOUNTER SYMPTOMS
NAUSEA: 0
SHORTNESS OF BREATH: 0
VOMITING: 0
ABDOMINAL PAIN: 0

## 2020-02-08 ASSESSMENT — PAIN DESCRIPTION - PAIN TYPE: TYPE: ACUTE PAIN

## 2020-02-08 ASSESSMENT — PAIN DESCRIPTION - ORIENTATION: ORIENTATION: RIGHT

## 2020-02-08 ASSESSMENT — PAIN DESCRIPTION - LOCATION: LOCATION: FOOT

## 2020-02-08 NOTE — ED TRIAGE NOTES
Pt presents to the ED through triage with complaints of right foot pain. Was trying to catch her flight when she stepped down and heard a \"pop\" in her right heel. States pain 9/10. Pt has some swelling bilaterally to lower extremities, states that it is from traveling today.

## 2020-02-08 NOTE — ED PROVIDER NOTES
needed for Anxiety    LEVOCETIRIZINE (XYZAL) 5 MG TABLET    Take 1 tablet by mouth nightly    LORATADINE (CLARITIN) 10 MG TABLET    Take 1 tablet by mouth daily    MULTIPLE VITAMIN (MULTIVITAMIN PO)    Take  by mouth daily. OMEPRAZOLE (PRILOSEC) 40 MG DELAYED RELEASE CAPSULE    Take 1 capsule by mouth every morning (before breakfast)    POLYCARBOPHIL (FIBERCON) 625 MG TABLET    Take tablets twice a day    SPIRONOLACTONE (ALDACTONE) 100 MG TABLET    Take 100 mg by mouth daily. SUMATRIPTAN (IMITREX) 100 MG TABLET    Take 1 tablet by mouth once as needed for Migraine       ALLERGIES     is allergic to bactrim [sulfamethoxazole-trimethoprim] and codeine. FAMILY HISTORY     She indicated that her mother is alive. She indicated that her father is alive. She indicated that her brother is alive. She indicated that her maternal grandmother is . She indicated that her maternal grandfather is . She indicated that her paternal grandmother is . She indicated that her paternal grandfather is . She indicated that the status of her other is unknown. She indicated that the status of her neg hx is unknown.   family history includes Cancer in her maternal grandmother; Depression in her mother; Diabetes in her maternal grandfather, paternal grandfather, and another family member; Heart Disease in her mother; High Blood Pressure in her father and mother; High Cholesterol in her father. SOCIAL HISTORY      reports that she quit smoking about 4 years ago. Her smoking use included cigarettes. She has a 1.00 pack-year smoking history. She has never used smokeless tobacco. She reports that she does not drink alcohol or use drugs. PHYSICAL EXAM     INITIAL VITALS:  height is 5' 8\" (1.727 m) and weight is 287 lb (130.2 kg). Her oral temperature is 97.9 °F (36.6 °C). Her blood pressure is 165/97 (abnormal) and her pulse is 99. Her respiration is 18 and oxygen saturation is 97%.     Physical No acute fracture or dislocation. Soft tissue swelling of the dorsum of the midfoot. Large calcaneal spurs. **This report has been created using voice recognition software. It may contain minor errors which are inherent in voice recognition technology. **      Final report electronically signed by Dr. Rj Gibson on 2/8/2020 3:21 AM          LABS:     Labs Reviewed - No data to display    EMERGENCY DEPARTMENT COURSE:   Vitals:    Vitals:    02/08/20 0246   BP: (!) 165/97   Pulse: 99   Resp: 18   Temp: 97.9 °F (36.6 °C)   TempSrc: Oral   SpO2: 97%   Weight: 287 lb (130.2 kg)   Height: 5' 8\" (1.727 m)       3:33 AM: The patient was seen and evaluated. MDM:  Pertinent Labs & Imaging studies reviewed. (See chart for details)    The patient was seen and evaluated within the ED today with foot pain. Within the department, I observed the patient's vital signs to be within acceptable range. On exam, I appreciated findings as documented in the physical exam.  Radiological studies within the department revealed bone spurs but no acute finding. Laboratory work was not indicated. I observed the patient's condition to remain stable during the duration of the stay and I explained my proposed course of treatment to the patient, who was amenable to my decision. They were discharged home in stable condition with instructions to follow-up with her podiatrist, and the patient will return to the ED if the symptoms become more severe in nature or otherwise change    I have given the patient strict written and verbal instructions about care at home, follow-up, and signs and symptoms of worsening of condition and they did verbalize understanding. CRITICAL CARE:   none    CONSULTS:  none    PROCEDURES:  none    FINAL IMPRESSION      1. Sprain of right foot, initial encounter          DISPOSITION/PLAN   Discharge      PATIENT REFERRED TO:  No follow-up provider specified.     DISCHARGE MEDICATIONS:  New

## 2020-02-09 ASSESSMENT — ENCOUNTER SYMPTOMS
CHEST TIGHTNESS: 0
WHEEZING: 0
TROUBLE SWALLOWING: 0
COUGH: 0
SORE THROAT: 0
RHINORRHEA: 0

## 2020-02-10 ENCOUNTER — TELEPHONE (OUTPATIENT)
Dept: FAMILY MEDICINE CLINIC | Age: 45
End: 2020-02-10

## 2020-06-03 ENCOUNTER — TELEPHONE (OUTPATIENT)
Dept: ENT CLINIC | Age: 45
End: 2020-06-03

## 2020-06-03 NOTE — TELEPHONE ENCOUNTER
Patient called complaining of the feeling of water in her left ear. Patient stated she was in an airplane in February. She also stated she is already on an allergy medication and has tried Benadryl and nothing has helped. Please advise.

## 2020-06-11 ENCOUNTER — OFFICE VISIT (OUTPATIENT)
Dept: ENT CLINIC | Age: 45
End: 2020-06-11
Payer: COMMERCIAL

## 2020-06-11 VITALS
DIASTOLIC BLOOD PRESSURE: 74 MMHG | HEART RATE: 76 BPM | HEIGHT: 68 IN | SYSTOLIC BLOOD PRESSURE: 128 MMHG | RESPIRATION RATE: 14 BRPM | BODY MASS INDEX: 44.41 KG/M2 | TEMPERATURE: 97.4 F | WEIGHT: 293 LBS

## 2020-06-11 PROCEDURE — G8427 DOCREV CUR MEDS BY ELIG CLIN: HCPCS | Performed by: PHYSICIAN ASSISTANT

## 2020-06-11 PROCEDURE — G8417 CALC BMI ABV UP PARAM F/U: HCPCS | Performed by: PHYSICIAN ASSISTANT

## 2020-06-11 PROCEDURE — 99214 OFFICE O/P EST MOD 30 MIN: CPT | Performed by: PHYSICIAN ASSISTANT

## 2020-06-11 PROCEDURE — 1036F TOBACCO NON-USER: CPT | Performed by: PHYSICIAN ASSISTANT

## 2020-06-11 RX ORDER — BUDESONIDE
0.6 POWDER (GRAM) MISCELLANEOUS 2 TIMES DAILY
Qty: 60 BOTTLE | Refills: 3 | Status: SHIPPED | OUTPATIENT
Start: 2020-06-11 | End: 2021-09-10

## 2020-06-11 RX ORDER — MONTELUKAST SODIUM 10 MG/1
10 TABLET ORAL DAILY
Qty: 90 TABLET | Refills: 0 | Status: SHIPPED | OUTPATIENT
Start: 2020-06-11 | End: 2020-07-02

## 2020-06-11 RX ORDER — CETIRIZINE HYDROCHLORIDE 10 MG/1
10 TABLET ORAL DAILY
Qty: 90 TABLET | Refills: 0 | Status: SHIPPED | OUTPATIENT
Start: 2020-06-11 | End: 2020-10-27

## 2020-06-11 RX ORDER — MINOCYCLINE HYDROCHLORIDE 100 MG/1
100 CAPSULE ORAL 2 TIMES DAILY
COMMUNITY

## 2020-06-11 ASSESSMENT — ENCOUNTER SYMPTOMS
FACIAL SWELLING: 0
CHEST TIGHTNESS: 0
SORE THROAT: 0
VOMITING: 0
ABDOMINAL PAIN: 0
COUGH: 0
NAUSEA: 0
COLOR CHANGE: 0
WHEEZING: 0
SHORTNESS OF BREATH: 0
TROUBLE SWALLOWING: 0
CHOKING: 0
DIARRHEA: 0
APNEA: 0
STRIDOR: 0
RHINORRHEA: 0
SINUS PRESSURE: 0
VOICE CHANGE: 0

## 2020-06-11 NOTE — PROGRESS NOTES
SURGERY Right 2/25/14    Dr. Antonio Levels HISTORY  11/17/2016    Robotic Hysterectomy and Removal of Fimbriated Tubes x2    TUBAL LIGATION  2007        Objective: This is a 39 y.o. female. Patient is alert and oriented to person, place and time. Patient appears well developed, well nourished. Mood is happy with normal affect. Not obviously hearing impaired. No abnormality in speech noted. /74 (Site: Left Lower Arm, Position: Sitting)   Pulse 76   Temp 97.4 °F (36.3 °C) (Infrared)   Resp 14   Ht 5' 8\" (1.727 m)   Wt (!) 311 lb (141.1 kg)   BMI 47.29 kg/m²     Head:   Normocephalic, atraumatic. No obvious masses or lesions noted. Ears:  External ears: Normal: no scars, lesions or masses. Mastoid process: No erythema noted. No tenderness to palpation. R External auditory canal: clear and free of any pathology  L External auditory canal: clear and free of any pathology   Tympanic membranes:  R intact, translucent                                                  L dull, retracted   [place]:   [place]:  Right Ear:  512 hz - Result positive (air conduction greater than bone conduction)               Left Ear:  512 hz - Result negative (bone conduction is greater than air conduction)  [place]: 512 hz.  Result - lateralizes to the left  Nose:    External nose: Appears midline. No obvious deformity or masses. Septum:  normal. No septal hematoma. No perforation. Mucosa:  clear  Turbinates: pale and edematous            Discharge:  clear    Mouth/Throat:  Lips, tongue and oral cavity: Normal. No masses or lesions noted. Patient reports clicking in the jaw with movement. No palpable crepitus bilaterally  Dentition: good, no malocclusion  Oral mucosa: moist  Tonsils: present, not acutely enlarged  Oropharynx: normal-appearing mucosa  Hard and soft palates: symmetrical and intact. Salivary glands: not enlarged and no tenderness to palpation.   Uvula: midline, no

## 2020-07-02 ENCOUNTER — OFFICE VISIT (OUTPATIENT)
Dept: ALLERGY | Age: 45
End: 2020-07-02
Payer: COMMERCIAL

## 2020-07-02 VITALS
TEMPERATURE: 97.3 F | WEIGHT: 293 LBS | RESPIRATION RATE: 20 BRPM | DIASTOLIC BLOOD PRESSURE: 86 MMHG | HEIGHT: 68 IN | HEART RATE: 80 BPM | SYSTOLIC BLOOD PRESSURE: 138 MMHG | BODY MASS INDEX: 44.41 KG/M2

## 2020-07-02 PROCEDURE — G8427 DOCREV CUR MEDS BY ELIG CLIN: HCPCS | Performed by: NURSE PRACTITIONER

## 2020-07-02 PROCEDURE — 1036F TOBACCO NON-USER: CPT | Performed by: NURSE PRACTITIONER

## 2020-07-02 PROCEDURE — 99214 OFFICE O/P EST MOD 30 MIN: CPT | Performed by: NURSE PRACTITIONER

## 2020-07-02 PROCEDURE — G8417 CALC BMI ABV UP PARAM F/U: HCPCS | Performed by: NURSE PRACTITIONER

## 2020-07-02 ASSESSMENT — ENCOUNTER SYMPTOMS
FACIAL SWELLING: 0
CHEST TIGHTNESS: 0
VOICE CHANGE: 0
STRIDOR: 0
COLOR CHANGE: 0
TROUBLE SWALLOWING: 0
EYE DISCHARGE: 1
RHINORRHEA: 1
EYE REDNESS: 1
SINUS PRESSURE: 1
COUGH: 0
SHORTNESS OF BREATH: 0
APNEA: 0
SORE THROAT: 1
VOMITING: 0
WHEEZING: 0
DIARRHEA: 0
NAUSEA: 0
ABDOMINAL PAIN: 0
CHOKING: 0

## 2020-07-02 NOTE — PROGRESS NOTES
Allergy & Asthma   [address]  [facility]KT MELISSAALEXANDERPro[facility] AM OFFHAILEGG II.JAQUI, [address]  [address] [facility]  Fax: [phone]          Chief Complaint:   Chief Complaint   Patient presents with    Allergic Rhinitis      New patient here for evaluation of her allergic rhinitis. Referred by Paddy Mohr. HISTORY OF PRESENT ILLNESS: NEW PATIENT TO PRACTICE   82-year-old  female here today for chronic allergic rhinoconjunctivitis and serous otitis media at left ear. Patient states that this is been gone especially since February. She reports chronic allergies for years. Severity is moderate to severe. Patient takes both Claritin and Zyrtec to try to mitigate her allergies. She denies any nausea vomiting fever today. Patient states that she has tried various other allergy medications including fluticasone and Singulair none of which to help. She was referred to this office by ENT. Onset of allergies is been for several years. She states that she recently has moved and her parents [place] farm. She reports that she has allergies year-round. She does report aeroallergens seem to be the worse when she is exposed to pollens. Patient recently was out to [address] [place] [address] and noticed that her allergies have been worse since she got back. Patient complains of runny stuffy nose. Eczema and a rash especially on her hands. Sinusitis. She states that she does have sometimes ringing in her left ear. She has chronic sinusitis. Her ears also has fullness and popping. She does sneeze. She does have a sore throat and postnasal drip. She does have a lot of clearing of her throat. Patient does have occasional shortness of breath with exercise. She gets heartburn which she takes omeprazole. She does have migraines. It does get behind her left eye she feels like they are sinus headaches. Patient was approximately age 23 at onset of allergies. She does have a dog which is a chill while I. She does have carpet throughout her home. Weather changes does make her allergies worse. They also get worse whenever she is mowing the grass or exposed to cats. She denies any nasal surgery or any nasal polyps. She has not had any previous knee surgery. Review of Systems:  Review of Systems   Constitutional: Negative for activity change, appetite change, chills, diaphoresis, fatigue, fever and unexpected weight change. HENT: Positive for congestion, postnasal drip, rhinorrhea, sinus pressure, sneezing, sore throat and tinnitus. Negative for dental problem, ear discharge, ear pain, facial swelling, hearing loss, mouth sores, nosebleeds, trouble swallowing and voice change. Eyes: Positive for discharge and redness. Negative for visual disturbance. Respiratory: Negative for apnea, cough, choking, chest tightness, shortness of breath, wheezing and stridor. Cardiovascular: Negative for chest pain, palpitations and leg swelling. Gastrointestinal: Negative for abdominal pain, diarrhea, nausea and vomiting. Endocrine: Negative for cold intolerance, heat intolerance, polydipsia and polyuria. Genitourinary: Negative for difficulty urinating, dysuria, enuresis, hematuria and urgency. Musculoskeletal: Negative for arthralgias, gait problem, neck pain and neck stiffness. Skin: Negative for color change and rash. Allergic/Immunologic: Positive for environmental allergies. Negative for food allergies and immunocompromised state. Neurological: Negative for dizziness, syncope, facial asymmetry, speech difficulty, light-headedness and headaches. Hematological: Negative for adenopathy. Does not bruise/bleed easily. Psychiatric/Behavioral: Negative for confusion and sleep disturbance. The patient is not nervous/anxious. All other systems reviewed and are negative.       Past Medical History:    Past Medical History:   Diagnosis Date    Hypertension     DURING PREGNANCY       Past Surgical History:    Past Surgical History:   Procedure Laterality Date    DILATION AND CURETTAGE OF UTERUS      X 4     ENDOMETRIAL ABLATION  2007    HYSTERECTOMY      KNEE CARTILAGE SURGERY Right 14    Dr. Keren Oneill HISTORY  2016    Robotic Hysterectomy and Removal of Fimbriated Tubes x2    TUBAL LIGATION         Family History:   Family History   Problem Relation Age of Onset    High Blood Pressure Mother     Depression Mother     Heart Disease Mother         irreg heart    High Cholesterol Father     High Blood Pressure Father     Diabetes Maternal Grandfather     Diabetes Other     Cancer Maternal Grandmother         uterine    Diabetes Paternal Grandfather     Stroke Neg Hx        Social History:   Social History     Tobacco Use    Smoking status: Former Smoker     Packs/day: 0.25     Years: 4.00     Pack years: 1.00     Types: Cigarettes     Last attempt to quit:      Years since quittin.5    Smokeless tobacco: Never Used   Substance Use Topics    Alcohol use: No     Comment: rare        Allergies: Allergies   Allergen Reactions    Bactrim [Sulfamethoxazole-Trimethoprim]     Codeine Nausea And Vomiting       Current Medications:   Prior to Visit Medications    Medication Sig Taking?  Authorizing Provider   minocycline (MINOCIN;DYNACIN) 100 MG capsule Take 100 mg by mouth 2 times daily  Historical Provider, MD   cetirizine (ZYRTEC) 10 MG tablet Take 1 tablet by mouth daily  BETH Hooks   Budesonide POWD 0.6 mg by Nasal route 2 times daily Add to 8oz mixture of distilled water and 1 packet of neilmed sinus rinse packet or sachet  BETH Hooks   escitalopram (LEXAPRO) 10 MG tablet TAKE 1 TABLET BY MOUTH EVERY DAY  JASSON Leon CNP   SUMAtriptan (IMITREX) 100 MG tablet Take 1 tablet by mouth once as needed for Migraine  JASSON Renteria CNP   loratadine (CLARITIN) 10 MG tablet Take 1 tablet by mouth daily  JASSON Renteria CNP   polycarbophil (FIBERCON) 625 MG tablet Take tablets twice a day  JASSON Lombardo CNP   omeprazole (PRILOSEC) 40 MG delayed release capsule Take 1 capsule by mouth every morning (before breakfast)  JASSON Lombardo CNP   spironolactone (ALDACTONE) 100 MG tablet Take 100 mg by mouth daily. Historical Provider, MD   Multiple Vitamin (MULTIVITAMIN PO) Take  by mouth daily. Historical Provider, MD   B Complex Vitamins (VITAMIN B COMPLEX PO) Take  by mouth daily. Historical Provider, MD       Vitals:  Vitals:    07/02/20 0954   BP: 138/86   Pulse: 80   Resp: 20   Temp: 97.3 °F (36.3 °C)       (!) 306 lb 3.2 oz (138.9 kg)           Physical Exam:  Physical Exam  Vitals signs and nursing note reviewed. Constitutional:       Appearance: Normal appearance. She is well-developed. HENT:      Head: Normocephalic. Right Ear: Tympanic membrane, ear canal and external ear normal.      Left Ear: Tympanic membrane, ear canal and external ear normal.      Ears:      Comments: Serous fluid noted left tm - effusion     Nose: Nose normal.      Mouth/Throat:      Mouth: Mucous membranes are moist.      Pharynx: No oropharyngeal exudate. Eyes:      General: No scleral icterus. Right eye: No discharge. Left eye: No discharge. Extraocular Movements: Extraocular movements intact. Conjunctiva/sclera: Conjunctivae normal.      Pupils: Pupils are equal, round, and reactive to light. Neck:      Musculoskeletal: Normal range of motion and neck supple. Thyroid: No thyromegaly. Cardiovascular:      Rate and Rhythm: Normal rate and regular rhythm. Pulses: Normal pulses. Heart sounds: Normal heart sounds. Pulmonary:      Effort: Pulmonary effort is normal. No respiratory distress. Breath sounds: Normal breath sounds. No wheezing or rales. Abdominal:      Palpations: Abdomen is soft. Tenderness: There is no abdominal tenderness. Musculoskeletal: Normal range of motion. 3 VIEWS ); Future    Gastroesophageal reflux disease without esophagitis    Left chronic serous otitis media    Chronic pansinusitis  -     CBC Auto Differential; Future  -     IgA; Future  -     IgE; Future  -     IgG; Future  -     IgM; Future  -     XR SINUSES (MIN 3 VIEWS ); Future    Allergic rhinoconjunctivitis        Return in about 1 month (around 8/3/2020) for Allergy Testing, Lab review, Radiology Review. Total time spent with patient greater than 45 minutes with at least 50% being in education.   (Please note that portions of this note may have been completed with a voice recognition program.  Efforts were made to edit the dictation but occasionally words are mis-transcribed.)        Signed:   JASSON Cotton CNP  7/2/2020  10:34 AM

## 2020-08-04 ENCOUNTER — HOSPITAL ENCOUNTER (OUTPATIENT)
Age: 45
Discharge: HOME | End: 2020-08-04
Payer: COMMERCIAL

## 2020-08-04 ENCOUNTER — HOSPITAL ENCOUNTER (OUTPATIENT)
Dept: GENERAL RADIOLOGY | Age: 45
Discharge: HOME | End: 2020-08-04
Payer: COMMERCIAL

## 2020-08-04 PROCEDURE — 70220 X-RAY EXAM OF SINUSES: CPT

## 2020-08-04 PROCEDURE — 36415 COLL VENOUS BLD VENIPUNCTURE: CPT

## 2020-08-04 PROCEDURE — 85025 COMPLETE CBC W/AUTO DIFF WBC: CPT

## 2020-08-04 PROCEDURE — 82785 ASSAY OF IGE: CPT

## 2020-08-04 PROCEDURE — 82784 ASSAY IGA/IGD/IGG/IGM EACH: CPT

## 2020-08-05 LAB
BASOPHILS # BLD: 0.8 %
BASOPHILS ABSOLUTE: 0.1 THOU/MM3 (ref 0–0.1)
EOSINOPHIL # BLD: 3.9 %
EOSINOPHILS ABSOLUTE: 0.5 THOU/MM3 (ref 0–0.4)
ERYTHROCYTE [DISTWIDTH] IN BLOOD BY AUTOMATED COUNT: 14.8 % (ref 11.5–14.5)
ERYTHROCYTE [DISTWIDTH] IN BLOOD BY AUTOMATED COUNT: 51.4 FL (ref 35–45)
HCT VFR BLD CALC: 43.9 % (ref 37–47)
HEMOGLOBIN: 13.8 GM/DL (ref 12–16)
IMMATURE GRANS (ABS): 0.03 THOU/MM3 (ref 0–0.07)
IMMATURE GRANULOCYTES: 0.3 %
LYMPHOCYTES # BLD: 26.7 %
LYMPHOCYTES ABSOLUTE: 3.2 THOU/MM3 (ref 1–4.8)
MCH RBC QN AUTO: 29.9 PG (ref 26–33)
MCHC RBC AUTO-ENTMCNC: 31.4 GM/DL (ref 32.2–35.5)
MCV RBC AUTO: 95 FL (ref 81–99)
MONOCYTES # BLD: 5.3 %
MONOCYTES ABSOLUTE: 0.6 THOU/MM3 (ref 0.4–1.3)
NUCLEATED RED BLOOD CELLS: 0 /100 WBC
PLATELET # BLD: 310 THOU/MM3 (ref 130–400)
PMV BLD AUTO: 10.6 FL (ref 9.4–12.4)
RBC # BLD: 4.62 MILL/MM3 (ref 4.2–5.4)
SEG NEUTROPHILS: 63 %
SEGMENTED NEUTROPHILS ABSOLUTE COUNT: 7.5 THOU/MM3 (ref 1.8–7.7)
WBC # BLD: 11.9 THOU/MM3 (ref 4.8–10.8)

## 2020-08-06 ENCOUNTER — NURSE ONLY (OUTPATIENT)
Dept: LAB | Age: 45
End: 2020-08-06

## 2020-08-06 ENCOUNTER — PROCEDURE VISIT (OUTPATIENT)
Dept: ALLERGY | Age: 45
End: 2020-08-06
Payer: COMMERCIAL

## 2020-08-06 VITALS
SYSTOLIC BLOOD PRESSURE: 122 MMHG | HEART RATE: 68 BPM | RESPIRATION RATE: 16 BRPM | TEMPERATURE: 97.4 F | DIASTOLIC BLOOD PRESSURE: 80 MMHG

## 2020-08-06 LAB
IGA: 137 MG/DL (ref 70–400)
IGE: 189 IU/ML
IGG: 737 MG/DL (ref 700–1600)
IGM: 54 MG/DL (ref 40–230)

## 2020-08-06 PROCEDURE — 1036F TOBACCO NON-USER: CPT | Performed by: NURSE PRACTITIONER

## 2020-08-06 PROCEDURE — G8417 CALC BMI ABV UP PARAM F/U: HCPCS | Performed by: NURSE PRACTITIONER

## 2020-08-06 PROCEDURE — G8427 DOCREV CUR MEDS BY ELIG CLIN: HCPCS | Performed by: NURSE PRACTITIONER

## 2020-08-06 PROCEDURE — 99213 OFFICE O/P EST LOW 20 MIN: CPT | Performed by: NURSE PRACTITIONER

## 2020-08-06 RX ORDER — AMOXICILLIN AND CLAVULANATE POTASSIUM 875; 125 MG/1; MG/1
1 TABLET, FILM COATED ORAL 2 TIMES DAILY
Qty: 42 TABLET | Refills: 0 | Status: SHIPPED | OUTPATIENT
Start: 2020-08-06 | End: 2020-08-27

## 2020-08-06 RX ORDER — FLUCONAZOLE 100 MG/1
100 TABLET ORAL DAILY
Qty: 6 TABLET | Refills: 0 | Status: SHIPPED | OUTPATIENT
Start: 2020-08-06 | End: 2021-09-10

## 2020-08-06 ASSESSMENT — ENCOUNTER SYMPTOMS
SORE THROAT: 0
WHEEZING: 0
ABDOMINAL PAIN: 0
EYE REDNESS: 1
STRIDOR: 0
COUGH: 0
APNEA: 0
SHORTNESS OF BREATH: 0
RHINORRHEA: 1
COLOR CHANGE: 0
TROUBLE SWALLOWING: 0
CHEST TIGHTNESS: 0
FACIAL SWELLING: 0
VOICE CHANGE: 0
DIARRHEA: 0
CHOKING: 0
SINUS PRESSURE: 1
VOMITING: 0
NAUSEA: 0

## 2020-08-06 NOTE — PROGRESS NOTES
exposed to cats. She denies any nasal surgery or any nasal polyps. She has not had any previous knee surgery. Patient states that she does notice that she is come off her allergy medicine she has had increased congestion. However she has moved twice           Review of Systems:  Review of Systems   Constitutional: Negative for activity change, appetite change, chills, diaphoresis, fatigue, fever and unexpected weight change. HENT: Positive for congestion, postnasal drip, rhinorrhea and sinus pressure. Negative for dental problem, ear discharge, ear pain, facial swelling, hearing loss, mouth sores, nosebleeds, sneezing, sore throat, tinnitus, trouble swallowing and voice change. Eyes: Positive for redness. Negative for visual disturbance. Respiratory: Negative for apnea, cough, choking, chest tightness, shortness of breath, wheezing and stridor. Cardiovascular: Negative for chest pain, palpitations and leg swelling. Gastrointestinal: Negative for abdominal pain, diarrhea, nausea and vomiting. Endocrine: Negative for cold intolerance, heat intolerance, polydipsia and polyuria. Genitourinary: Negative for difficulty urinating, dysuria, enuresis, hematuria and urgency. Musculoskeletal: Negative for arthralgias, gait problem, neck pain and neck stiffness. Skin: Negative for color change and rash. Allergic/Immunologic: Positive for environmental allergies. Negative for immunocompromised state. Neurological: Negative for dizziness, syncope, facial asymmetry, speech difficulty, light-headedness and headaches. Hematological: Negative for adenopathy. Does not bruise/bleed easily. Psychiatric/Behavioral: Negative for confusion and sleep disturbance. The patient is not nervous/anxious. All other systems reviewed and are negative.         Past MedicalHistory:    Past Medical History:   Diagnosis Date    Hypertension     DURING PREGNANCY       Past Surgical History:  Past Surgical History: Procedure Laterality Date    DILATION AND CURETTAGE OF UTERUS      X 4     ENDOMETRIAL ABLATION      HYSTERECTOMY      KNEE CARTILAGE SURGERY Right 14    Dr. Altamirano Solid HISTORY  2016    Robotic Hysterectomy and Removal of Fimbriated Tubes x2    TUBAL LIGATION         Family History:   Family History   Problem Relation Age of Onset    High Blood Pressure Mother     Depression Mother     Heart Disease Mother         irreg heart    High Cholesterol Father     High Blood Pressure Father     Diabetes Maternal Grandfather     Diabetes Other     Cancer Maternal Grandmother         uterine    Diabetes Paternal Grandfather     Stroke Neg Hx        Social History:   Social History     Tobacco Use    Smoking status: Former Smoker     Packs/day: 0.25     Years: 4.00     Pack years: 1.00     Types: Cigarettes     Last attempt to quit:      Years since quittin.6    Smokeless tobacco: Never Used   Substance Use Topics    Alcohol use: No     Comment: rare        Allergies:  Bactrim [sulfamethoxazole-trimethoprim] and Codeine    CurrentMedications:     Current Outpatient Medications:     amoxicillin-clavulanate (AUGMENTIN) 875-125 MG per tablet, Take 1 tablet by mouth 2 times daily for 21 days, Disp: 42 tablet, Rfl: 0    fluconazole (DIFLUCAN) 100 MG tablet, Take 1 tablet by mouth daily Take one by mouth, wait 3 days if symptoms persist repeat, Disp: 6 tablet, Rfl: 0    minocycline (MINOCIN;DYNACIN) 100 MG capsule, Take 100 mg by mouth 2 times daily, Disp: , Rfl:     cetirizine (ZYRTEC) 10 MG tablet, Take 1 tablet by mouth daily, Disp: 90 tablet, Rfl: 0    escitalopram (LEXAPRO) 10 MG tablet, TAKE 1 TABLET BY MOUTH EVERY DAY, Disp: 90 tablet, Rfl: 0    loratadine (CLARITIN) 10 MG tablet, Take 1 tablet by mouth daily, Disp: 90 tablet, Rfl: 3    polycarbophil (FIBERCON) 625 MG tablet, Take tablets twice a day, Disp: 360 tablet, Rfl: 3   Musculoskeletal: Normal range of motion and neck supple. Cardiovascular:      Rate and Rhythm: Normal rate and regular rhythm. Pulses: Normal pulses. Heart sounds: Normal heart sounds. Pulmonary:      Effort: Pulmonary effort is normal.      Breath sounds: Normal breath sounds. Musculoskeletal: Normal range of motion. Skin:     General: Skin is warm and dry. Capillary Refill: Capillary refill takes less than 2 seconds. Neurological:      General: No focal deficit present. Mental Status: She is alert and oriented to person, place, and time. Mental status is at baseline. Psychiatric:         Mood and Affect: Mood normal.         Behavior: Behavior normal.         Thought Content: Thought content normal.         Judgment: Judgment normal.             DATA:  Lab Review:    CBC:   Lab Results   Component Value Date    WBC 11.9 08/04/2020    RBC 4.62 08/04/2020    RBC 4.59 04/27/2016    HGB 13.8 08/04/2020    HCT 43.9 08/04/2020    MCV 95.0 08/04/2020    MCH 29.9 08/04/2020    MCHC 31.4 08/04/2020    RDW 11.6 10/01/2017     08/04/2020          No results found for: IGE   No results found for: IGG  No results found for: IGA   No results found for: IGM    No results found for: KAYLYN   No results found for: RF       Results for orders placed during the hospital encounter of 08/04/20   XR SINUSES (MIN 3 VIEWS )    Narrative PROCEDURE: XR SINUSES (MIN 3 VIEWS )    CLINICAL INFORMATION: Acute allergic rhinitis due to pollen, Chronic pansinusitis. COMPARISON: No prior study. TECHNIQUE: 4 views of the skull/sinuses were obtained. FINDINGS:    There is no fracture or other osseous abnormality. The sinuses are clear. No mucosal thickening or fluid level. Sella turcica is of normal size and shape. Impression Normal sinus series. **This report has been created using voice recognition software.  It may contain minor errors which are inherent in voice recognition next visit. Patient will repeat CBC in 4 weeks for leukocytosis and elevated absolute eosinophils    Pending IgE may consider Dupixent or Xolair if indicated. Patient has dermatitis herpetiformis. We will add celiac panel. This is noted on her elbows. She also gets skin rash noted on her hands. Patient has possible  atopic dermatitis  Patient to start back on antihistamines see if this helps improve overall condition.   If she gains benefit we will hold on Biologics    Total time sent with patient 30 minutes with greater than 50% in patient education    (Please note that portions of this note may have been completed with a voice recognition program.  Efforts were made to edit the dictation but occasionally words are mis-transcribed.)         Signed:  Debara Sandifer, APRN - CNP  8/6/2020  1:03 PM

## 2020-08-08 LAB — CELIAC SEROLOGY: NORMAL

## 2020-08-21 RX ORDER — ESCITALOPRAM OXALATE 10 MG/1
TABLET ORAL
Qty: 90 TABLET | Refills: 0 | Status: SHIPPED | OUTPATIENT
Start: 2020-08-21 | End: 2020-09-10 | Stop reason: DRUGHIGH

## 2020-08-21 NOTE — TELEPHONE ENCOUNTER
Last written: 6-5-20 #90/0   Last seen: 8-26-19  Next visit: no Future    [id number] message sent to pt to schedule #90/0

## 2020-08-31 RX ORDER — OMEPRAZOLE 40 MG/1
CAPSULE, DELAYED RELEASE ORAL
Qty: 90 CAPSULE | Refills: 3 | Status: SHIPPED | OUTPATIENT
Start: 2020-08-31 | End: 2021-08-31

## 2020-08-31 NOTE — TELEPHONE ENCOUNTER
Request sent from [facility] pharmacy for refill on omeprazole 40 mg qd. Last seen 8/26/19, no future appt scheduled. LMTCB with patient to schedule annual visit.

## 2020-09-09 RX ORDER — LORATADINE 10 MG/1
TABLET ORAL
Qty: 90 TABLET | Refills: 3 | Status: SHIPPED | OUTPATIENT
Start: 2020-09-09 | End: 2021-08-31

## 2020-09-10 ENCOUNTER — NURSE ONLY (OUTPATIENT)
Dept: LAB | Age: 45
End: 2020-09-10

## 2020-09-10 ENCOUNTER — OFFICE VISIT (OUTPATIENT)
Dept: FAMILY MEDICINE CLINIC | Age: 45
End: 2020-09-10
Payer: COMMERCIAL

## 2020-09-10 VITALS
HEIGHT: 68 IN | TEMPERATURE: 97.1 F | DIASTOLIC BLOOD PRESSURE: 84 MMHG | HEART RATE: 86 BPM | RESPIRATION RATE: 16 BRPM | WEIGHT: 293 LBS | BODY MASS INDEX: 44.41 KG/M2 | SYSTOLIC BLOOD PRESSURE: 130 MMHG

## 2020-09-10 LAB
BASOPHILS # BLD: 0.7 %
BASOPHILS ABSOLUTE: 0.1 THOU/MM3 (ref 0–0.1)
EOSINOPHIL # BLD: 4.1 %
EOSINOPHILS ABSOLUTE: 0.4 THOU/MM3 (ref 0–0.4)
ERYTHROCYTE [DISTWIDTH] IN BLOOD BY AUTOMATED COUNT: 13.8 % (ref 11.5–14.5)
ERYTHROCYTE [DISTWIDTH] IN BLOOD BY AUTOMATED COUNT: 47.1 FL (ref 35–45)
HCT VFR BLD CALC: 44 % (ref 37–47)
HEMOGLOBIN: 14.3 GM/DL (ref 12–16)
IMMATURE GRANS (ABS): 0.04 THOU/MM3 (ref 0–0.07)
IMMATURE GRANULOCYTES: 0.4 %
LYMPHOCYTES # BLD: 26.3 %
LYMPHOCYTES ABSOLUTE: 2.6 THOU/MM3 (ref 1–4.8)
MCH RBC QN AUTO: 29.9 PG (ref 26–33)
MCHC RBC AUTO-ENTMCNC: 32.5 GM/DL (ref 32.2–35.5)
MCV RBC AUTO: 91.9 FL (ref 81–99)
MONOCYTES # BLD: 6.8 %
MONOCYTES ABSOLUTE: 0.7 THOU/MM3 (ref 0.4–1.3)
NUCLEATED RED BLOOD CELLS: 0 /100 WBC
PLATELET # BLD: 288 THOU/MM3 (ref 130–400)
PMV BLD AUTO: 10.2 FL (ref 9.4–12.4)
RBC # BLD: 4.79 MILL/MM3 (ref 4.2–5.4)
SEG NEUTROPHILS: 61.7 %
SEGMENTED NEUTROPHILS ABSOLUTE COUNT: 6 THOU/MM3 (ref 1.8–7.7)
WBC # BLD: 9.7 THOU/MM3 (ref 4.8–10.8)

## 2020-09-10 PROCEDURE — 99396 PREV VISIT EST AGE 40-64: CPT | Performed by: NURSE PRACTITIONER

## 2020-09-10 RX ORDER — ESCITALOPRAM OXALATE 10 MG/1
TABLET ORAL
Qty: 90 TABLET | Refills: 0 | Status: CANCELLED | OUTPATIENT
Start: 2020-09-10

## 2020-09-10 RX ORDER — ESCITALOPRAM OXALATE 20 MG/1
20 TABLET ORAL DAILY
Qty: 90 TABLET | Refills: 3 | Status: SHIPPED | OUTPATIENT
Start: 2020-09-10 | End: 2021-08-31

## 2020-09-10 RX ORDER — CALCIUM POLYCARBOPHIL 625 MG 625 MG/1
TABLET ORAL
Qty: 360 TABLET | Refills: 3 | COMMUNITY
Start: 2020-09-10 | End: 2021-09-10 | Stop reason: SDUPTHER

## 2020-09-10 RX ORDER — SUMATRIPTAN 100 MG/1
100 TABLET, FILM COATED ORAL
Qty: 9 TABLET | Refills: 3 | Status: SHIPPED | OUTPATIENT
Start: 2020-09-10 | End: 2021-06-07

## 2020-09-10 ASSESSMENT — PATIENT HEALTH QUESTIONNAIRE - PHQ9
SUM OF ALL RESPONSES TO PHQ9 QUESTIONS 1 & 2: 1
2. FEELING DOWN, DEPRESSED OR HOPELESS: 0
SUM OF ALL RESPONSES TO PHQ QUESTIONS 1-9: 1
SUM OF ALL RESPONSES TO PHQ QUESTIONS 1-9: 1
1. LITTLE INTEREST OR PLEASURE IN DOING THINGS: 1

## 2020-09-10 ASSESSMENT — ENCOUNTER SYMPTOMS
SHORTNESS OF BREATH: 0
NAUSEA: 0
BLOOD IN STOOL: 0
VOMITING: 0
CONSTIPATION: 0
DIARRHEA: 0

## 2020-09-10 NOTE — PATIENT INSTRUCTIONS

## 2020-09-10 NOTE — PROGRESS NOTES
Chief Complaint   Patient presents with    Annual Exam     medication orders pending. Aaron Lara is a 39 y. o.female    Pt presents for annual wellness physical exam.      Pt stable since last visit- no new problems for diagnoses listed below:  Patient Active Problem List   Diagnosis    IBS (irritable bowel syndrome)    Internal hemorrhoids    GERD (gastroesophageal reflux disease)    Morbid obesity ([address]    Tobacco dependence    DUB (dysfunctional uterine bleeding)     Pt does get an occasional migraine, but states they have been stable. Pt is here for depression. Pt currently taking Lexapro 10mg. Side effects noted: none    Sleep-OK. \"More of a night owl\"  Interest- Has lack of ambition to get out of bed some days  Guilt- OK  Energy- Sometimes okay, sometimes lower  Concentration- \"lisa off\" Has a lack of focus  Appetite- OK  Psychomotor Retardation- NO  Suicidal/ Homicidal Ideations- NO  Anxiety-Much better    Employer? Working from home right now     Review of Systems   Constitutional: Negative for chills, diaphoresis and fever. Respiratory: Negative for shortness of breath. Cardiovascular: Negative for chest pain, palpitations and leg swelling. Gastrointestinal: Negative for blood in stool, constipation, diarrhea, nausea and vomiting. Genitourinary: Negative for dysuria and hematuria. Musculoskeletal: Negative for myalgias. Neurological: Positive for headaches (migraines - stable). Negative for dizziness. Psychiatric/Behavioral: Positive for decreased concentration. All other systems reviewed and are negative.       OBJECTIVE     /84   Pulse 86   Temp 97.1 °F (36.2 °C)   Resp 16   Ht 5' 7.72\" (1.72 m)   Wt (!) 307 lb 3.2 oz (139.3 kg)   BMI 47.10 kg/m²     Wt Readings from Last 3 Encounters:   09/10/20 (!) 307 lb 3.2 oz (139.3 kg)   07/02/20 (!) 306 lb 3.2 oz (138.9 kg)   06/11/20 (!) 311 lb (141.1 kg)       Physical Exam  Vitals signs and nursing note reviewed. Constitutional:       Appearance: She is well-developed. HENT:      Head: Normocephalic and atraumatic. Right Ear: External ear normal.      Left Ear: External ear normal.      Nose: Nose normal.   Eyes:      Conjunctiva/sclera: Conjunctivae normal.      Pupils: Pupils are equal, round, and reactive to light. Neck:      Musculoskeletal: Normal range of motion and neck supple. Cardiovascular:      Rate and Rhythm: Normal rate and regular rhythm. Heart sounds: Normal heart sounds. Pulmonary:      Effort: Pulmonary effort is normal.      Breath sounds: Normal breath sounds. Abdominal:      General: Bowel sounds are normal.      Palpations: Abdomen is soft. Musculoskeletal: Normal range of motion. Skin:     General: Skin is warm and dry. Neurological:      Mental Status: She is alert and oriented to person, place, and time. Deep Tendon Reflexes: Reflexes are normal and symmetric. Psychiatric:         Behavior: Behavior normal.         Thought Content:  Thought content normal.         Judgment: Judgment normal.         Immunization History   Administered Date(s) Administered    Influenza Virus Vaccine 11/01/2012, 12/05/2013, 12/02/2014, 09/30/2015, 11/15/2016    Influenza Whole 11/25/2011    Influenza, Quadv, IM, (6 mo and older Fluzone, Flulaval, Fluarix and 3 yrs and older Afluria) 12/19/2018    Influenza, Quadv, IM, PF (6 mo and older Fluzone, Flulaval, Fluarix, and 3 yrs and older Afluria) 12/31/2019    Influenza, Triv, 3 Years and older, IM (Afluria (5 yrs and older) 11/15/2016    Td, unspecified formulation 01/01/1995    Tdap (Boostrix, Adacel) 07/23/2012         Health Maintenance   Topic Date Due    HIV screen  05/05/1992    Cervical cancer screen  02/01/2016    Diabetes screen  05/05/2017    Potassium monitoring  08/08/2019    Creatinine monitoring  08/08/2019    Flu vaccine (1) 09/01/2020    Lipid screen  03/03/2022    DTaP/Tdap/Td vaccine (3 - Td) 07/23/2022    Hepatitis A vaccine  Aged Out    Hepatitis B vaccine  Aged Out    Hib vaccine  Aged Out    Meningococcal (ACWY) vaccine  Aged Out    Pneumococcal 0-64 years Vaccine  Aged Out         ASSESSMENT       Diagnosis Orders   1. Well adult exam  Basic Metabolic Panel    Lipid Panel   2. Screening, lipid  Lipid Panel   3. Screening for diabetes mellitus  Basic Metabolic Panel   4. Morbid obesity ([address]     5. Anxiety         PLAN        Orders Placed This Encounter   Procedures    Basic Metabolic Panel     Standing Status:   Future     Standing Expiration Date:   9/10/2021    Lipid Panel     Standing Status:   Future     Standing Expiration Date:   9/10/2021     Order Specific Question:   Is Patient Fasting?/# of Hours     Answer:   yes/12       Requested Prescriptions     Signed Prescriptions Disp Refills    SUMAtriptan (IMITREX) 100 MG tablet 9 tablet 3     Sig: Take 1 tablet by mouth once as needed for Migraine    polycarbophil (FIBERCON) 625 MG tablet 360 tablet 3     Sig: Take tablets twice a day    escitalopram (LEXAPRO) 20 MG tablet 90 tablet 3     Sig: Take 1 tablet by mouth daily     Encouraged annual FLU VACCINE after October 1st.    Mammo due after 10/22/20  Pap/pelvic management per Dr. Chad Infante. Annual was a few months ago.    Labs - BMP, lipid now  Continue current medications  Refills sent  Follow up annually and as needed      Electronically signed by Lawanna Gilford, APRN - CNP on 9/10/2020 at 12:50 PM

## 2020-09-14 ENCOUNTER — OFFICE VISIT (OUTPATIENT)
Dept: ALLERGY | Age: 45
End: 2020-09-14
Payer: COMMERCIAL

## 2020-09-14 VITALS
RESPIRATION RATE: 16 BRPM | DIASTOLIC BLOOD PRESSURE: 84 MMHG | TEMPERATURE: 97.3 F | WEIGHT: 293 LBS | BODY MASS INDEX: 46.61 KG/M2 | SYSTOLIC BLOOD PRESSURE: 136 MMHG | HEART RATE: 80 BPM

## 2020-09-14 PROCEDURE — 99213 OFFICE O/P EST LOW 20 MIN: CPT | Performed by: NURSE PRACTITIONER

## 2020-09-14 RX ORDER — EPINEPHRINE 0.3 MG/.3ML
INJECTION SUBCUTANEOUS
Qty: 2 EACH | Refills: 1 | Status: SHIPPED | OUTPATIENT
Start: 2020-09-14 | End: 2021-09-10

## 2020-09-14 RX ORDER — MONTELUKAST SODIUM 10 MG/1
10 TABLET ORAL NIGHTLY
Qty: 90 TABLET | Refills: 1 | Status: SHIPPED | OUTPATIENT
Start: 2020-09-14 | End: 2021-03-05

## 2020-09-14 ASSESSMENT — ENCOUNTER SYMPTOMS
SHORTNESS OF BREATH: 1
RHINORRHEA: 1
SINUS PRESSURE: 1
SINUS PAIN: 1

## 2020-09-14 NOTE — PROGRESS NOTES
@[email or web address]@    Allergy & Asthma   [address] [facility], [address]  Ph:   [phone]  Fax:[phone]    Provider:  Dr. David Arciniega:   Chief Complaint   Patient presents with    Follow-up     Patient is here for 4 week follow up and lab review 8/28/20. HISTORY OF PRESENT ILLNESS: ESTABLISHED PATIENT HERE FOR EVALUATION     32-year-old  female here today for chronic allergic rhinoconjunctivitis. She reports chronic allergies for years. Severity is moderate to severe. Patient takes both Claritin and Zyrtec to try to mitigate her allergies. She denies any nausea vomiting fever today. Patient states that she has tried various other allergy medications including fluticasone and Singulair none of which to help. She was referred to this office by ENT. Onset of allergies is been for several years. She is back today for follow-up and additional testing that she had performed. She states that she recently has moved and [place]. She reports that she has allergies year-round. She does report aeroallergens seem to be the worse when she is exposed to pollens. Patient recently was out to [place] and noticed that her allergies have been worse since she got back. Patient complains of runny stuffy nose. Eczema and a rash especially on her hands. Sinusitis. She states that she does have sometimes ringing in her left ear. She has chronic sinusitis. Her ears also has fullness and popping. She does sneeze. She does have a sore throat and postnasal drip. She does have a lot of clearing of her throat. Patient does have occasional shortness of breath with exercise. Review of Systems:  Review of Systems   HENT: Positive for congestion, postnasal drip, rhinorrhea, sinus pressure and sinus pain. Respiratory: Positive for shortness of breath.          Shortness of breath with exercise   Allergic/Immunologic: Positive for environmental allergies. All other systems reviewed and are negative. Past MedicalHistory:    Past Medical History:   Diagnosis Date    Hypertension     DURING PREGNANCY       Past Surgical History:  Past Surgical History:   Procedure Laterality Date    DILATION AND CURETTAGE OF UTERUS      X 4     ENDOMETRIAL ABLATION      HYSTERECTOMY      KNEE CARTILAGE SURGERY Right 14    Dr. Flores Kill HISTORY  2016    Robotic Hysterectomy and Removal of Fimbriated Tubes x2    TUBAL LIGATION         Family History:   Family History   Problem Relation Age of Onset    High Blood Pressure Mother     Depression Mother     Heart Disease Mother         irreg heart    High Cholesterol Father     High Blood Pressure Father     Diabetes Maternal Grandfather     Diabetes Other     Cancer Maternal Grandmother         uterine    Diabetes Paternal Grandfather     Stroke Neg Hx        Social History:   Social History     Tobacco Use    Smoking status: Former Smoker     Packs/day: 0.25     Years: 4.00     Pack years: 1.00     Types: Cigarettes     Last attempt to quit:      Years since quittin.7    Smokeless tobacco: Never Used   Substance Use Topics    Alcohol use: No     Comment: rare        Allergies:  Bactrim [sulfamethoxazole-trimethoprim] and Codeine    CurrentMedications:     Current Outpatient Medications:     montelukast (SINGULAIR) 10 MG tablet, Take 1 tablet by mouth nightly, Disp: 90 tablet, Rfl: 1    betamethasone valerate (VALISONE) 0.1 % cream, Apply topically 2 times daily. , Disp: 1 Tube, Rfl: 0    EPINEPHrine (AUVI-Q) 0.3 MG/0.3ML SOAJ injection, Dispense 2 packs of 2 (total 4 devices). Use as directed, STAT for allergic reaction. , Disp: 2 each, Rfl: 1    polycarbophil (FIBERCON) 625 MG tablet, Take tablets twice a day, Disp: 360 tablet, Rfl: 3    escitalopram (LEXAPRO) 20 MG tablet, Take 1 tablet by mouth daily, Disp: 90 tablet, Rfl: 3    loratadine (CLARITIN) 10 MG tablet, TAKE 1 TABLET EVERY DAY, Disp: 90 tablet, Rfl: 3    omeprazole (PRILOSEC) 40 MG delayed release capsule, TAKE 1 CAPSULE EVERY MORNING BEFORE BREAKFAST, Disp: 90 capsule, Rfl: 3    fluconazole (DIFLUCAN) 100 MG tablet, Take 1 tablet by mouth daily Take one by mouth, wait 3 days if symptoms persist repeat, Disp: 6 tablet, Rfl: 0    minocycline (MINOCIN;DYNACIN) 100 MG capsule, Take 100 mg by mouth 2 times daily, Disp: , Rfl:     spironolactone (ALDACTONE) 100 MG tablet, Take 100 mg by mouth daily. , Disp: , Rfl:     Multiple Vitamin (MULTIVITAMIN PO), Take  by mouth daily. , Disp: , Rfl:     B Complex Vitamins (VITAMIN B COMPLEX PO), Take  by mouth daily. , Disp: , Rfl:     SUMAtriptan (IMITREX) 100 MG tablet, Take 1 tablet by mouth once as needed for Migraine, Disp: 9 tablet, Rfl: 3    Budesonide POWD, 0.6 mg by Nasal route 2 times daily Add to 8oz mixture of distilled water and 1 packet of neilmed sinus rinse packet or sachet, Disp: 60 Bottle, Rfl: 3      Physical Exam:      Vitals:    Vitals:    09/14/20 0915   BP: 136/84   Pulse: 80   Resp: 16   Temp: 97.3 °F (36.3 °C)       (!) 304 lb (137.9 kg)       Temp: 97.3 °F (36.3 °C) I @FLOWSTAT(6)@ IPulse: 80 I @FLOWSTAT(8)@ I BP: 136/84 I @ZMPBGF(30)@; @IGXMYS(88)@ I Resp: 16 I @FLOWSTAT(9)@ I   I @FLOWSTAT(10)@ I   I   I   I Facility age limit for growth percentiles is 20 years. I     Facility age limit for growth percentiles is 20 years. Facility age limit for growth percentiles is 20 years. Facility age limit for growth percentiles is 20 years. Facility age limit for growth percentiles is 20 years. Physical Exam:    Physical Exam  Vitals signs and nursing note reviewed. Constitutional:       Appearance: Normal appearance. She is normal weight. HENT:      Head: Normocephalic and atraumatic.       Right Ear: Tympanic membrane, ear canal and external ear normal.      Left Ear: Tympanic membrane, ear Date/Time Value Ref Range Status   08/04/2020 09:40  70 - 400 mg/dL Final     Comment:     [facility] [address], [address] [place] [address] [phone]      IgM   Date/Time Value Ref Range Status   08/04/2020 09:40 PM 54 40 - 230 mg/dL Final     Comment:     [facility] [address], [address] [facility] [phone]       No results found for: KAYLYN   No results found for: RF       Results for orders placed during the hospital encounter of 08/04/20   XR SINUSES (MIN 3 VIEWS )    Narrative PROCEDURE: XR SINUSES (MIN 3 VIEWS )    CLINICAL INFORMATION: Acute allergic rhinitis due to pollen, Chronic pansinusitis. COMPARISON: No prior study. TECHNIQUE: 4 views of the skull/sinuses were obtained. FINDINGS:    There is no fracture or other osseous abnormality. The sinuses are clear. No mucosal thickening or fluid level. Sella turcica is of normal size and shape. Impression Normal sinus series. **This report has been created using voice recognition software. It may contain minor errors which are inherent in voice recognition technology. **         Final report electronically signed by Dr. Teressa Arango on 8/5/2020 12:43 AM      No results found for this or any previous visit. PROCEDURES:      Skin Testing performed on: 08/06/2020    Assessment/Orders:    Diagnosis Orders   1. Non-seasonal allergic rhinitis due to pollen     2. Gastroesophageal reflux disease without esophagitis     3. Allergic rhinoconjunctivitis     4. Elevated IgE level     5. Intrinsic atopic dermatitis     6. Allergy to trees     7. Acute seasonal allergic rhinitis due to pollen         Plan:  Follow Up:6 months    Patient to start on allergy shots.   She will receive the first allergy injection at this office from each vial and subsequent injections at the [facility]    Patient has been explained risk and benefits of allergy shots as well as epinephrine for anaphylaxis    Patient to take Singulair 10 mg at night  Patient to use betamethasone 0.1% cream for rash at elbows and hand. Atopic dermatitis  We will consider adding tacrolimus ointment 0.1% twice daily if betamethasone ineffective        Total time sent with patient 30 minutes with greater than 50% in patient education    (Please note that portions of this note may have been completed with a voice recognition program.  Efforts were made to edit the dictation but occasionally words are mis-transcribed.)       Ulysses Citizen  Dr. Jazz Coffman  [address], Markus LOPEZ/Constance Hatch  [phone]    Allergy Injection Guide      Dear Patient,    Your doctor has recommended allergy shots (immunotherapy or I. T.) for the treatment of your allergies. Considerable dedication is required of the patient/family who have agreed to immunotherapy. The injections are weekly or bi-weekly injections until a high dose of allergen is reached. Only after a high or maintenance dose is reached will the full benefit  of the injections be appreciated. It may take up to a year for symptom relief to occur. The full effect of the treatment will need to be adhered to for a minimum of three years. Following the weekly build-up dose, maintenance injections are slowly tapered to a minimum of once a month injections. Missed shots or reactions to shots can delay reaching maintenance dosage. A six-month follow-up visit with the provider is required in order to assess progress; yearly visits are then required. Please inform our office if you feel that the injections are not working. Let us know as soon as possible if you become pregnant. We will not build up your doses until after your baby is born. As with any long-term treatment or therapy, yearly evaluations and assessments by your provider are necessary to optimize and safely monitor your response. Your provider will periodically review with you your progress at scheduled appointments.  However, situations may arise in between visits that may be important to the success of your treatment. Therefore, we ask your cooperation in alerting the injection personnel before receiving an allergy injection of any changes in your health (heart disease, diabetes, cancer, pregnancy etc.) or medications (specifically beta-blockers prescribed by other providers and any over-the-counter, homeopathic, herbal or alternative medicines). This booklet is designed as a resource for you to refer to, for questions that may arise over the course of your treatment. However, do not assume the extent of the information contained within these pages is  your only resource. If you have questions or situations that arise please feel free to ask. Sincerely,  [facility]  [facility], DNP    General Allergy Injection Procedure  1. We will confirm your name and date of birth along with asking you to by checking the name on your vials and date of birth at each visit. 2.. We will ask if you had any problems with your last injection after you left the office. Specifically:   Bumps at the site that may have developed or enlarged - especially the day after the injection.  Itching away from the injection site   Hives   Sneezing   Shortness of breath, wheezing or chest tightness   Throat tightness  If any of these symptoms, or anything you may be concerned or have questions about has arisen, now is the time to discuss these concerns, NOT after you have received your injection. (See Allergy Injection Reactions)  3. We will need to be able to get to your upper arm to give your injection, so you may want to consider wearing short sleeves and/or easy clothing to remove on days you plan on getting an injection. 4. Allergy injections are given in the outer aspect of the upper arm. Other areas of the arm will increase the irritation or may affect local nerves. Please do not ask for an injection in any other place.   5. After your injection you are REQUIRED to wait 30 minutes in the waiting room. This is for your safety in the event of serious side effects developing. Remember: A systemic life-threatening reaction may occur at any time, even after years of trouble free injections. 6. Before leaving the office have your arm checked by the nurses or medical staff. 7. If you are a new injection patient we ask that you come in twice a week in order to reach your maximum concentration or  maintenance dose as quickly as possible. Thereafter you will be coming in weekly. The nurse/medical assistant or provider will inform you when you can reduce to weekly injections based on your schedule and after the nurse/medical assistant speaks with the provider. 10. Ordering antigen. Insurances will require us to make up either a 3 or 6-month supply of antigen. 11. Vacations/Missed Doses. Please get an injection immediately before leaving on vacation and upon return. This minimizes the impact of being off your injections for an extended period of time. 12. Please notify us of any new medications prescribed by other providers. Some medications (specifically beta-blockers meds ending in [address] as in [place], these medications are contraindicated if on allergy injections. If you are on a beta-blocker medication, we will have to discontinue allergy injections and request that you see your provider about a possible change in medications. 13. Do not exercise or play sports 1 hour before or after receiving an allergy injection. 14. Do not get another injection/immunization on the same day (i.e. MMR, DPT, Influenza, Pneumonia, etc.)  15. Women - Please notify us immediately if you become pregnant. We prefer to STOP allergy injections during the time of your pregnancy. 16. Please notify us immediately of any address or insurance change. If we are not notified of insurance changes you may be liable for charges incurred.   Do not get an injection if:   Running a fever   Experiencing asthma symptoms of any degree   Feel ill enough to miss school/work   Have a rash or hives. Call if you have any questions about receiving allergy injections when sick. (See Frequently Asked Questions). 17.  Always have your Epi-pen or Auvi-Q with you. For Patients Receiving Allergy Shots Outside Our Office  At times patients request to receive injections at an office other than one of ours. We are happy to work with your PCP so that you may receive injections. In these instances, certain things will become your responsibility to facilitate this process. 1. To receive injections at a MEDICAL office other than ours there must be a provider, provider assistant (PA), or nurse practitioner (NP) on duty at the time of the injection and for the mandatory 30-minute waiting period. 3. Insure the provider (PA or NP) will agree to give the allergy injections. Not all providers (PA or NP) are comfortable giving allergy injections or have the staff available to provide this service. Please do this before asking us to mail the antigen. 4. The antigen will be mailed via certified mail to the provider's office. The provider's office will be asked not to relinquish the antigen to you, but to mail it directly back to our office should it be necessary. 5. While receiving allergy injections outside our office, follow the recommended schedule of increasing dose by 0.05 per injection and do not exceed a maximum of 0.50 ml per dose. 6. DO NOT GIVE  ANTIGEN. Each antigen bottle is labeled with an expiration date, please dispose of  antigen. 7.  If there are any problems in regards to receiving the antigen you must come back to our office to receive any additional injections. 8. REORDERING - Approximately 2 weeks before the expiration date on the antigen bottles notify our office a refill is needed.  We MUST receive paperwork back from the outside office for injections given before the refill will be completed. How Do Allergy Injections Work? In order to understand the mechanism of allergy injections we must first review the allergic response. When pollen, dander, or another allergen is introduced into the body of an allergic person, the body may respond by developing allergic  antibodies (IgE). As more and more allergen over time are introduced into the body, more allergic antibodies are made. Allergic antibodies attach to specialized allergy cells called mast cells. On future allergen exposure, the mast cell is signaled to release histamine, leukotriene and other allergy causing chemicals that attach to various parts of the body  producing symptoms commonly seen with allergies like: sneezing, itchy watery eyes, runny nose, congestion, wheezing, cough, etc. Allergy injections stimulate the body to make blocking antibodies. Blocking antibody, as the name implies, blocks the attachment of allergens to allergic antibodies on mast cells, thereby preventing the release of the allergy causing chemicals (histamine, leukotriene, etc.) The longer the stimulation and production of blocking antibody (i.e. the longer allergy injections continue) the more complete the blocking of the mast cells. Also, while allergy injections  are increasing blocking antibodies, allergic antibodies are decreasing, thereby producing effective results by two mechanisms. What is in [address]? Allergy shots contain those allergens that were positive on skin testing (a positive test is a 3mm) after your provider considers your history and environmental exposures. A formula or recipe is written by your provider and the serum made is specific to your test. There are guidelines for mixing extracts, and a certain amount of experience on the allergist's part to optimize this process. But, let's go a little deeper with this question.  Where do we get the pollen, dander, mold spores and dust (and dust mite) that we use to make the allergy extract? The pollens are collected from cultivated fields, green houses, or from nature by vacuum collection or a drying process. The pollens are then filtered extensively to isolate a single pollen and to remove contaminants. The animal dander is collected from the pelt and skin of healthy animals. Mold spores and dust mite allergens are cultivated in controlled conditions in laboratories. The industry is rigorously monitored and each allergen is standardized to minimize variations. This is important to insure a constant potency. Finally, each allergen batch is tested before being approved for human use. A Note on Food Allergies  Immunotherapy to food allergies has not been proven safe and effective. Therefore, foods will not be put in your allergy injections. The best treatment for food allergies is avoidance of that food. Allergy Extracts  The allergy extract or antigen is the mix that you receive in your allergy injections. It is not a serum. Our provider individualizes each extract for the patient. It is reflective of your skin test results, history and environmental exposures. Our providers review the results of your skin testing along with your allergy history to generate a formula or recipe for your allergy extract. Each extract is formulated in a maximum concentration of 1:20 (allergen: diluent). From this 1:20 concentration a series of dilutions are made, each 10 times weaker than the previous, until the starting dilution is reached. Your starting dilution is determined by the provider and is dependent on sensitivity, history and the season in which you are initiating allergy injections. Each dilution is color coded for  easy identification. Top Color Concentration  Silver top 1:10,000   Green top 1:1,000  Blue top 1:100  Yellow top 1:10  Red top 1:1 (maintenance)    Most patients will start their injections in the 1:10,000 dilutions, and progress up from there in a stepwise fashion.  A minimum of ten shots of a pre-determined amount is given from each dilution, starting with a small amount and progressing to a larger and more potent amount with each injection, as long as there are no reactions noted. An ideal, reaction-free progression is as follows:  1:10,000 1:1,000  1:100  1:10  1:1  0.05cc  0.05cc  0.05cc  0.05cc  0.05cc  0.10cc  0.10cc  0.10cc  0.10cc  0.10cc  0.15cc  0.15cc  0.15cc  0.15cc  0.15cc  0.20cc  0.20cc  0.20cc  0.20cc  0.20cc  0.25cc  0.25cc  0.25cc  0.25cc  0.25cc  0.30cc  0.30cc  0.30cc  0.30cc  0.30cc  0.35cc  0.35cc  0.35cc  0.35cc  0.35cc  0.40cc  0.40cc  0.40cc  0.40cc  0.40cc  0.45cc  0.45cc  0.45cc  0.45cc  0.45cc  0.50cc  0.50cc  0.50cc  0.50cc  0.50cc        Exceptions to the progression noted above do occur. The injection personnel and/or your provider will decide if it is necessary to reduce, hold or slow your progression at a particular level. This decision is based on reactions, type of reactions, symptomatic response, and your comfort level. Allergy Injection Reactions  Allergy injections, just like any medication or therapy, has the potential for adverse reactions. However, with other medication reactions you may be required to stop that medication, with allergy injections we expect some degree of reaction to occur. Which isn't to say we ignore reactions, just the opposite. We pay very, very close attention to  reactions that occur from allergy injections. We want and need for you to do the same. These reactions occur in two forms: local and systemic. This is your body's way of telling us we are progressing too fast. THIS IS NOT A RACE! Local Reactions  These are reactions that occur at the injection site and consist of itching and bumps. Depending on the size of the bump and degree of itching, your shot may be reduced slightly. After a reduction, you will resume advancing just as you did before the reaction occurred.  In most instances you will progress right past this same dose without difficulty. Occasionally, some patients may have difficulty progressing through a dilution, and if so, we will slow the progression and advance you  slower by either repeating a dose or advancing in smaller amounts. Local reactions of bumps bigger than a dime in size or an uncomfortable amount of itching are required to be reported to the injection personnel before you leave the office. If the reaction gets larger or itchier after leaving the office, please report the final size to the injection personnel before your next  injection. Systemic Reaction  This reaction is life threatening. Almost all systemic reactions occur within in the first 30-minutes of receiving an allergy injection, hence the reason for the mandatory 30-minute wait in our office or any medical facility after receiving an allergy  injection. A systemic reaction may occur at any time during your allergy treatment. Even with being on your maintenance dose for several years, it is still possible (and does occur) for a systemic reaction to occur. A systemic reaction may have any or all of the following:   Sneezing   Runny and/or itchy nose   Nasal congestion   Itchy and/or watery eyes   Itchy ears or palate (roof of the mouth)   Coughing   Shortness of breath or wheezing   Fainting   Dizziness   Itchy throat   Throat tightness   Hives or itchy skin   Flushing of the skin    If these symptoms develop in our office, please tell the injection personnel or nearest employee immediately. If these symptoms develop outside our office, you will require treatment at the nearest emergency facility immediately. USE YOUR EPIPEN OR AUVI-Q IF NECESSARY. A systemic reaction will necessitate a reduction of one full dilution in your allergy injections. Delayed Reactions  Not all reactions occur within the 30-minute waiting period.  You may have a delayed reaction (local or systemic) where symptoms can develop up to 24 hours later. These reactions must be reported to us before your next injection. They are as important as reactions that occur in the office. Please make it a habit to recheck your injection site throughout the day and report any bumps that develop. (See Local and Systemic Reactions above). It's Not a Race! Immunotherapy (allergy injections) is an important medical treatment for what can be a very serious disease. Over 25% of the population is estimated to have allergic rhinitis (incidentally, 35% of these also have asthma). Studies indicate 1.5 million school days are lost each year and close to 3.4 million work days due to allergies. Prescription medications are conservatively estimated at over $5 billion dollars annually, and the annual loss in work productivity is estimated at  many millions of dollars. Immunotherapy will greatly impact these statistics, improving quality of life, decreasing overall medication costs, and improving productivity at work and school. However, we must caution you that this is not a race! Do NOT blindly proceed with the aim of the maximum concentration (1:20) as your goal. Your goal (and ours) is safety first and then symptom relief. All patients will not be able to obtain the maximum concentration level of 1:20; some sensitive patients may have a lower maintenance concentration. The maintenance concentration (the dose at which the body is most comfortable and doesn't produce repeated severe reactions) will be individualized and adjusted as tolerated. Your maintenance concentration may be lower than the maximum concentration of 1:20 if you are a sensitive individual. A majority will obtain the 1:20 concentration after about 3-4 months of twice weekly injections. Some may never reach that level. These are generalities; you will benefit from your allergy injections at any concentration.  Our goal is to get you safely to your maintenance concentration to effectively reduce your response you will receive. When going on vacation or business trips try as much as possible to get an injection just prior to leaving, and as soon as possible upon return. For most short trips (< 21 days) allergy injections will be minimally affected. 3. Should I receive an injection if I'm sick? If you are ill enough not to go to work or school, postpone your shot. If you are running a fever or having asthma symptoms, do not get an injection until these symptoms have completely resolved. You may receive an injection if you are on an antibiotic or prednisone as long as any fever and/or asthma symptoms have resolved completely. 4. Should I tell my other providers that I'm on allergy injections? Yes, even though this is not a medication. There are medications that should not be given with allergy injections, so please always list your allergy injections on medication forms or whenever asked about medications. 5. How long will I be on allergy injections. The average is 3-5 years. Remaining consistent (weekly) with injections will help achieve a quicker response. Those who have few allergies may respond faster than those who have many allergies. Do not stop the injections on your own; if concerns arise please contact our office to discuss them. 6. What to do if my son/daughter is going away to college and they are on allergy injections? Allergy injections are given at most colleges. Frequently, the student health department will give allergy injections. We will send the allergy extract on to them with complete instructions. If the institution has any questions, they are instructed to contact us before giving any allergy injection. We will then see the student for regular checkups during school breaks when they are home. 7. What do I do if I'm moving? If you are moving, you will need to contact an allergist local to the area in which you are moving.  We will need their contact information to transfer your care. We will mail allergy extract to your new provider. 8. Can I, or a friend, give myself injections at home? No. This is a treatment with serious implications. Should you have a reaction you would not be able to give yourself CPR or drive yourself to an emergency room or even call for help. Injections are only given in a facility with a medical provider available. 9. How do I reorder antigen when it is all gone or ? If you receive your injections in our office, we will reorder as necessary. If you are receiving your injections at another office, see the section For Patients Receiving Allergy Shots Outside [address] [place] [address]. 10. What if I am, or want to become, pregnant? Notify us immediately, if you are or think you are pregnant. During the course of your pregnancy we prefer you stop injections. .  11. Can I get a shot if I have a rash? It may depend on the rash and location of the rash. If there is a new rash on the arms we won't be able to give you an injection. If this is a chronic condition and hasn't changed or doesn't involve the arms you can proceed with injections. In general, all new rashes (or hives) will have to be assessed on a case-by-case basis. Please feel free to call our office to discuss. 12. I always get a bump after my shot, is this significant? Yes, ALL bumps are significant. We need to know how soon after your shot they occurred, how big the final size was before they resolved, and in fact did they resolve? This is your body's warning signal. Please do not do yourself a disservice by not reporting these bumps.     PLEASE NOTIFY US IMMEDIATELY IF YOU ON THE FOLLOWING:  Beta Blocker Medications  Brand Generic  Betapace     Acebutolol  Betapace AF    Atenolol  Blocadren   Betaxolol  Brevibloc   Bisprolol   Cartrol   Carteolol  Corzide   Esmolol  Inderal    Lebetalol  Inderal LA   Metoprolol  InnoPran XL   Pindolol   Kerlone   Propranolol  Nadolol   Sotalol   Sectral Timolol   Tenorectic  Tenormin  Timolide  Toprol-XL   Zebeta   Ziac    Opthalmic Preparations  Betaxon  Betimol  Betoptic  Cosopt  Timoptic  Timoptic XE    MAO Inhibitors  Nardil . .......................................................... Regina Pee Phenelzine sulfate  Parnate . ....................................................... Regina Pee Tranylcpromine sulfate      Signed:  JASSON Myles - CNP  9/14/2020  10:35 AM

## 2020-09-21 ENCOUNTER — TELEPHONE (OUTPATIENT)
Dept: ENT CLINIC | Age: 45
End: 2020-09-21

## 2020-09-21 NOTE — TELEPHONE ENCOUNTER
Patient called apologizing for missing for first allergy shot appointment. The patient stated that she is having issues with her insurance getting an epipen. She asked if she could have still gotten her shots without her epipen on her. I told her that its highly, recommended that patients have their epipen with them when getting allergy shots. The patient said when her insurance approves her epipen she will call to schedule allergy shots appointments.

## 2020-10-12 ENCOUNTER — NURSE ONLY (OUTPATIENT)
Dept: ALLERGY | Age: 45
End: 2020-10-12
Payer: COMMERCIAL

## 2020-10-12 VITALS
RESPIRATION RATE: 16 BRPM | HEART RATE: 68 BPM | SYSTOLIC BLOOD PRESSURE: 118 MMHG | TEMPERATURE: 97.7 F | DIASTOLIC BLOOD PRESSURE: 82 MMHG

## 2020-10-12 PROCEDURE — 95115 IMMUNOTHERAPY ONE INJECTION: CPT | Performed by: NURSE PRACTITIONER

## 2020-10-12 NOTE — TELEPHONE ENCOUNTER
Patient wants allergy serum sent to other practice to receive allergy shots twice weekly per office notes 9/14/20. Patient will schedule allergy shots with [facility] [place] [address] for allergy shots:  Fax number is [phone]  Phone number [phone]  10/14/20 is patient's first appointment with the practice in [place] [address].

## 2020-10-13 NOTE — TELEPHONE ENCOUNTER
I contacted [facility] about the patientns allergy shots. They are willing to do the allergy shots for the patient. The patient is scheduled for 10/14/2020 for her next allergy shots. They told me they will change her appointment to 10/15/20. I will be sending the serums out today by [facility].

## 2020-10-22 ENCOUNTER — TELEPHONE (OUTPATIENT)
Dept: FAMILY MEDICINE CLINIC | Age: 45
End: 2020-10-22

## 2020-10-22 NOTE — TELEPHONE ENCOUNTER
Patient sent message to  through [employer] requesting COVID testing d/t exposure. Per VO of , ok for testing. Patient notified and order faxed to [facility].

## 2020-10-23 LAB — SARS-COV-2: NOT DETECTED

## 2020-10-27 RX ORDER — CETIRIZINE HYDROCHLORIDE 10 MG/1
TABLET ORAL
Qty: 90 TABLET | Refills: 0 | Status: SHIPPED | OUTPATIENT
Start: 2020-10-27 | End: 2021-03-05

## 2020-11-24 ENCOUNTER — HOSPITAL ENCOUNTER (OUTPATIENT)
Dept: MAMMOGRAPHY | Age: 45
Discharge: HOME | End: 2020-11-24
Payer: COMMERCIAL

## 2020-11-24 PROCEDURE — 77063 BREAST TOMOSYNTHESIS BI: CPT

## 2020-12-16 ENCOUNTER — VIRTUAL VISIT (OUTPATIENT)
Dept: FAMILY MEDICINE CLINIC | Age: 45
End: 2020-12-16
Payer: COMMERCIAL

## 2020-12-16 PROCEDURE — 99213 OFFICE O/P EST LOW 20 MIN: CPT | Performed by: NURSE PRACTITIONER

## 2020-12-16 PROCEDURE — G8427 DOCREV CUR MEDS BY ELIG CLIN: HCPCS | Performed by: NURSE PRACTITIONER

## 2020-12-16 ASSESSMENT — ENCOUNTER SYMPTOMS
RHINORRHEA: 1
SHORTNESS OF BREATH: 1
SORE THROAT: 1
NAUSEA: 0
VOMITING: 0
COUGH: 1
DIARRHEA: 0
CONSTIPATION: 0
BLOOD IN STOOL: 0
CHEST TIGHTNESS: 1

## 2020-12-16 NOTE — PATIENT INSTRUCTIONS
· Wash your hands often with soap or alcohol-based hand sanitizers. · Cover your mouth with a tissue when you cough or sneeze. Then throw the tissue in the trash. · Use a disinfectant to clean things that you touch often. These include doorknobs, remote controls, phones, and handles on your refrigerator and microwave. And don't forget countertops, tabletops, bathrooms, and computer keyboards. · Wear a cloth face cover if you have to go to public areas. If you know or suspect that you have COVID-19:  · Stay home. Don't go to school, work, or public areas. And don't use public transportation, ride-shares, or taxis unless you have no choice. · Leave your home only if you need to get medical care or testing. But call the doctor's office first so they know you're coming. And wear a face cover. · Limit contact with people in your home. If possible, stay in a separate bedroom and use a separate bathroom. · Wear a face cover whenever you're around other people. It can help stop the spread of the virus when you cough or sneeze. · Clean and disinfect your home every day. Use household  and disinfectant wipes or sprays. Take special care to clean things that you grab with your hands. · Self-isolate until it's safe to be around others again. ? If you have symptoms, it's safe when you haven't had a fever for 3 days and your symptoms have improved and it's been at least 10 days since your symptoms started. ? If you were exposed to the virus but don't have symptoms, it's safe to be around others 14 days after exposure. ? Talk to your doctor about whether you also need testing, especially if you have a weakened immune system. When to call for help  Call 911 anytime you think you may need emergency care. For example, call if:  · You have severe trouble breathing. (You can't talk at all.)  · You have constant chest pain or pressure. · You are severely dizzy or lightheaded. · You are confused or can't think clearly. · Your face and lips have a blue color. · You passed out (lost consciousness) or are very hard to wake up. Call your doctor now if you develop symptoms such as:  · Shortness of breath. · Fever. · Cough. If you need to get care, call ahead to the doctor's office for instructions before you go. Make sure you wear a face cover to prevent exposing other people to the virus. Where can you get the latest information? The following health organizations are tracking and studying this virus. Their websites contain the most up-to-date information. Hailee Vogel also learn what to do if you think you may have been exposed to the virus. · U.S. Centers for Disease Control and Prevention (CDC): The CDC provides updated news about the disease and travel advice. The website also tells you how to prevent the spread of infection. www.cdc.gov  · World Health Organization [facility]): WHO offers information about the virus outbreaks. WHO also has travel advice. www.who.int  Current as of: July 10, 2020               Content Version: 12.6  © 2006-2020 [employer]. Care instructions adapted under license by [facility] ([facility]). If you have questions about a medical condition or this instruction, always ask your healthcare professional. [address] any warranty or liability for your use of this information.

## 2020-12-16 NOTE — PROGRESS NOTES
[facility]  [facility]  Dept: [phone]  Dept Fax: [phone]      TELEHEALTH EVALUATION -- Audio/Visual (During DNRFQ-32 public health emergency)  This visit was performed via a synchronous telecommunication system. Pt location: Home  Provider location: Office ([address] [place] [address])  Pt notified that this was a virtual visit and that we will submit a claim for reimbursement to their insurance company. They were made aware that any copays, coinsurance amounts, or other amounts not covered will be their responsibility. Pt accepted? yes    Yolanda Gloria is a 39 y.o. female    Pt presents for concerns for Covid. She states she was very fatigued on Saturday. On Sunday she stayed in bed and that night she developed sore throat and achy ears. She does have a cough, nasal discharge, postnasal drainage, light headaches, chest congestion, slight sob with coughing spells. Denies fever or change in taste or smell. She has been using Martha seltzer plus and Claritin D with some relief of symptoms. Patient Active Problem List   Diagnosis    IBS (irritable bowel syndrome)    Internal hemorrhoids    GERD (gastroesophageal reflux disease)    Morbid obesity ([address]    Tobacco dependence    DUB (dysfunctional uterine bleeding)       Current Outpatient Medications   Medication Sig Dispense Refill    Cholecalciferol (VITAMIN D3 PO) Take by mouth      OLIVE LEAF PO Take by mouth      betamethasone valerate (VALISONE) 0.1 % cream APPLY TO AFFECTED AREA TWICE A DAY 45 g 1    cetirizine (ZYRTEC) 10 MG tablet TAKE 1 TABLET BY MOUTH EVERY DAY 90 tablet 0    montelukast (SINGULAIR) 10 MG tablet Take 1 tablet by mouth nightly 90 tablet 1    EPINEPHrine (AUVI-Q) 0.3 MG/0.3ML SOAJ injection Dispense 2 packs of 2 (total 4 devices). Use as directed, STAT for allergic reaction.  2 each 1  SUMAtriptan (IMITREX) 100 MG tablet Take 1 tablet by mouth once as needed for Migraine 9 tablet 3    polycarbophil (FIBERCON) 625 MG tablet Take tablets twice a day 360 tablet 3    escitalopram (LEXAPRO) 20 MG tablet Take 1 tablet by mouth daily 90 tablet 3    loratadine (CLARITIN) 10 MG tablet TAKE 1 TABLET EVERY DAY 90 tablet 3    omeprazole (PRILOSEC) 40 MG delayed release capsule TAKE 1 CAPSULE EVERY MORNING BEFORE BREAKFAST 90 capsule 3    fluconazole (DIFLUCAN) 100 MG tablet Take 1 tablet by mouth daily Take one by mouth, wait 3 days if symptoms persist repeat 6 tablet 0    minocycline (MINOCIN;DYNACIN) 100 MG capsule Take 100 mg by mouth 2 times daily      Budesonide POWD 0.6 mg by Nasal route 2 times daily Add to 8oz mixture of distilled water and 1 packet of neilmed sinus rinse packet or sachet 60 Bottle 3    spironolactone (ALDACTONE) 100 MG tablet Take 100 mg by mouth daily.  Multiple Vitamin (MULTIVITAMIN PO) Take  by mouth daily.  B Complex Vitamins (VITAMIN B COMPLEX PO) Take  by mouth daily. No current facility-administered medications for this visit. Review of Systems   Constitutional: Positive for fatigue. Negative for chills, diaphoresis and fever. HENT: Positive for congestion, ear pain, postnasal drip, rhinorrhea and sore throat. Respiratory: Positive for cough, chest tightness and shortness of breath (with coughing). Cardiovascular: Negative for chest pain, palpitations and leg swelling. Gastrointestinal: Negative for blood in stool, constipation, diarrhea, nausea and vomiting. Genitourinary: Negative for dysuria and hematuria. Musculoskeletal: Negative for myalgias. Neurological: Positive for headaches. Negative for dizziness. All other systems reviewed and are negative.       OBJECTIVE CREATININE 0.7 08/08/2018    GLUCOSE 106 08/08/2018    CALCIUM 9.4 08/08/2018    PROT 7.3 08/08/2018    LABALBU 4.0 08/08/2018    BILITOT 0.3 08/08/2018    ALKPHOS 87 08/08/2018    AST 21 08/08/2018    ALT 22 08/08/2018    LABGLOM >90 08/08/2018       No results found for: LABMICR, [id number]    Lab Results   Component Value Date    TSH 1.600 08/08/2018    T4FREE 1.08 03/03/2017       Lab Results   Component Value Date    WBC 9.7 09/10/2020    HGB 14.3 09/10/2020    HCT 44.0 09/10/2020    MCV 91.9 09/10/2020     09/10/2020       No results found for: PSA, PSADIA      Immunization History   Administered Date(s) Administered    Influenza Virus Vaccine 11/01/2012, 12/05/2013, 12/02/2014, 09/30/2015, 11/15/2016    Influenza Whole 11/25/2011    Influenza, Quadv, IM, (6 mo and older Fluzone, Flulaval, Fluarix and 3 yrs and older Afluria) 12/19/2018    Influenza, Quadv, IM, PF (6 mo and older Fluzone, Flulaval, Fluarix, and 3 yrs and older Afluria) 12/31/2019    Influenza, Triv, 3 Years and older, IM (Afluria (5 yrs and older) 11/15/2016    Td, unspecified formulation 01/01/1995    Tdap (Boostrix, Adacel) 07/23/2012       Health Maintenance   Topic Date Due    HIV screen  05/05/1992    Cervical cancer screen  02/01/2016    Diabetes screen  05/05/2017    Potassium monitoring  08/08/2019    Creatinine monitoring  08/08/2019    Lipid screen  03/03/2022    DTaP/Tdap/Td vaccine (3 - Td) 07/23/2022    Flu vaccine  Completed    Hepatitis A vaccine  Aged Out    Hepatitis B vaccine  Aged Out    Hib vaccine  Aged Out    Meningococcal (ACWY) vaccine  Aged Out    Pneumococcal 0-64 years Vaccine  Aged Out         Future Appointments   Date Time Provider Ike Mix   3/15/2021 11:00 AM Itz Garcia, APRN - CNP [facility] [place]   9/10/2021 12:15 PM Nini Lock MD [address] [facility]       ASSESSMENT       Diagnosis Orders   1.  Suspected COVID-19 virus infection 2. Cough  COVID-19 Ambulatory   3. Nasal congestion  COVID-19 Ambulatory   4. Fatigue, unspecified type  COVID-19 Ambulatory   5. Sore throat         PLAN     Covid testing ordered  Symptomatic Care  To ED with CP or sob  Increase fluids and rest  RTO if symptoms worsen or stay the same    19}    Pursuant to the emergency declaration under the [address] [place] [address] [facility] authority and the [employer] and [employer] Act, this Virtual  Visit was conducted, with patient's consent, to reduce the patient's risk of exposure to COVID-19 and provide continuity of care for an established patient. Services were provided through a video synchronous discussion virtually to substitute for in-person clinic visit. Electronically signed by JASSON Davis CNP on 12/16/2020 at 8:47 AM    Greater than 10 minutes was spent in contact with patient with greater than 50% in counseling and coordination of care. 11-20 minutes were spent on the digital evaluation and management of this patient.

## 2020-12-17 ENCOUNTER — PATIENT MESSAGE (OUTPATIENT)
Dept: FAMILY MEDICINE CLINIC | Age: 45
End: 2020-12-17

## 2020-12-17 NOTE — TELEPHONE ENCOUNTER
From: Chelsie Hill  To: JASSON Jarquin - CNP  Sent: 12/17/2020 12:17 AM EST  Subject: Visit Follow-Up Question    Can you send the covid test order to [facility] for both myself and Fior Bulls? We will go there tomorrow morning. Thank you!

## 2020-12-18 LAB — SARS-COV-2: NOT DETECTED

## 2021-03-05 RX ORDER — MONTELUKAST SODIUM 10 MG/1
TABLET ORAL
Qty: 90 TABLET | Refills: 1 | Status: SHIPPED | OUTPATIENT
Start: 2021-03-05 | End: 2021-09-10 | Stop reason: SDUPTHER

## 2021-06-03 ENCOUNTER — NURSE TRIAGE (OUTPATIENT)
Dept: OTHER | Facility: CLINIC | Age: 46
End: 2021-06-03

## 2021-06-03 NOTE — TELEPHONE ENCOUNTER
Reason for Disposition   Wheezing is present    Answer Assessment - Initial Assessment Questions  1. ONSET: \"When did the cough begin? \"     3 days ago    2. SEVERITY: \"How bad is the cough today? \"    productive, tightness in your chest, up at night    3. RESPIRATORY DISTRESS: \"Describe your breathing. \"   Tightness in her breath at times, when she has a coughing spell or talk a lot    4. FEVER: \"Do you have a fever? \" If so, ask: \"What is your temperature, how was it measured, and when did it start? \"  No    5. HEMOPTYSIS: \"Are you coughing up any blood? \" If so ask: \"How much? \" (flecks, streaks, tablespoons, etc.)   no      6. TREATMENT: \"What have you done so far to treat the cough? \" (e.g., meds, fluids, humidifier)   Martha lee sinus    7. CARDIAC HISTORY: \"Do you have any history of heart disease? \" (e.g., heart attack, congestive heart failure)   No    8. LUNG HISTORY: \"Do you have any history of lung disease? \"  (e.g., pulmonary embolus, asthma, emphysema)   smoker states off and on    9. PE RISK FACTORS: \"Do you have a history of blood clots? \" (or: recent major surgery, recent prolonged travel, bedridden)  No    10. OTHER SYMPTOMS: \"Do you have any other symptoms? (e.g., runny nose, wheezing, chest pain)  Sinus pressure    11. PREGNANCY: \"Is there any chance you are pregnant? \" \"When was your last menstrual period? \"     No, hysterectomy 2016    12. TRAVEL: \"Have you traveled out of the country in the last month? \" (e.g., travel history, exposures)       no    Protocols used: COUGH-ADULT-OH  Received call from Rosette Flanagan at pre-service center [facility]) [place] with [employer]. Brief description of triage: cough    Triage indicates for patient to be seen today by pcp. If appointment not available recommended to be seen at urgent care today. Care advice provided, patient verbalizes understanding; denies any other questions or concerns; instructed to call back for any new or worsening symptoms.     Writer provided warm transfer to [employer] at [facility] for appointment scheduling. Attention Provider: Thank you for allowing me to participate in the care of your patient. The patient was connected to triage in response to information provided to the ECC. Please do not respond through this encounter as the response is not directed to a shared pool.

## 2021-06-07 RX ORDER — SUMATRIPTAN 100 MG/1
100 TABLET, FILM COATED ORAL
Qty: 9 TABLET | Refills: 3 | Status: SHIPPED | OUTPATIENT
Start: 2021-06-07 | End: 2021-09-10 | Stop reason: SDUPTHER

## 2021-08-31 RX ORDER — ESCITALOPRAM OXALATE 20 MG/1
TABLET ORAL
Qty: 90 TABLET | Refills: 3 | Status: SHIPPED | OUTPATIENT
Start: 2021-08-31 | End: 2022-09-09

## 2021-08-31 RX ORDER — LORATADINE 10 MG/1
TABLET ORAL
Qty: 90 TABLET | Refills: 3 | Status: SHIPPED | OUTPATIENT
Start: 2021-08-31 | End: 2022-09-14

## 2021-08-31 RX ORDER — OMEPRAZOLE 40 MG/1
CAPSULE, DELAYED RELEASE ORAL
Qty: 90 CAPSULE | Refills: 3 | Status: SHIPPED | OUTPATIENT
Start: 2021-08-31 | End: 2022-09-09

## 2021-09-10 ENCOUNTER — OFFICE VISIT (OUTPATIENT)
Dept: FAMILY MEDICINE CLINIC | Age: 46
End: 2021-09-10
Payer: COMMERCIAL

## 2021-09-10 VITALS
DIASTOLIC BLOOD PRESSURE: 82 MMHG | BODY MASS INDEX: 44.41 KG/M2 | TEMPERATURE: 98.3 F | HEART RATE: 88 BPM | WEIGHT: 293 LBS | HEIGHT: 68 IN | SYSTOLIC BLOOD PRESSURE: 130 MMHG | RESPIRATION RATE: 16 BRPM

## 2021-09-10 DIAGNOSIS — E66.01 MORBID OBESITY (HCC): ICD-10-CM

## 2021-09-10 DIAGNOSIS — Z13.220 SCREENING, LIPID: ICD-10-CM

## 2021-09-10 DIAGNOSIS — Z00.00 WELL ADULT EXAM: Primary | ICD-10-CM

## 2021-09-10 PROCEDURE — 99396 PREV VISIT EST AGE 40-64: CPT | Performed by: NURSE PRACTITIONER

## 2021-09-10 RX ORDER — MONTELUKAST SODIUM 10 MG/1
TABLET ORAL
Qty: 90 TABLET | Refills: 3 | Status: SHIPPED | OUTPATIENT
Start: 2021-09-10 | End: 2022-09-09

## 2021-09-10 RX ORDER — SUMATRIPTAN 100 MG/1
100 TABLET, FILM COATED ORAL
Qty: 9 TABLET | Refills: 5 | Status: SHIPPED | OUTPATIENT
Start: 2021-09-10 | End: 2022-06-15

## 2021-09-10 RX ORDER — CETIRIZINE HYDROCHLORIDE 10 MG/1
TABLET ORAL
Qty: 90 TABLET | Refills: 3 | Status: SHIPPED | OUTPATIENT
Start: 2021-09-10 | End: 2022-09-09

## 2021-09-10 RX ORDER — MELOXICAM 15 MG/1
TABLET ORAL
COMMUNITY
Start: 2021-08-23

## 2021-09-10 RX ORDER — CALCIUM POLYCARBOPHIL 625 MG
TABLET ORAL
Qty: 360 TABLET | Refills: 3 | COMMUNITY
Start: 2021-09-10

## 2021-09-10 RX ORDER — HYDROCODONE BITARTRATE AND ACETAMINOPHEN 5; 325 MG/1; MG/1
TABLET ORAL
COMMUNITY
Start: 2021-07-13

## 2021-09-10 SDOH — ECONOMIC STABILITY: FOOD INSECURITY: WITHIN THE PAST 12 MONTHS, YOU WORRIED THAT YOUR FOOD WOULD RUN OUT BEFORE YOU GOT MONEY TO BUY MORE.: PATIENT DECLINED

## 2021-09-10 SDOH — ECONOMIC STABILITY: FOOD INSECURITY: WITHIN THE PAST 12 MONTHS, THE FOOD YOU BOUGHT JUST DIDN'T LAST AND YOU DIDN'T HAVE MONEY TO GET MORE.: PATIENT DECLINED

## 2021-09-10 ASSESSMENT — SOCIAL DETERMINANTS OF HEALTH (SDOH): HOW HARD IS IT FOR YOU TO PAY FOR THE VERY BASICS LIKE FOOD, HOUSING, MEDICAL CARE, AND HEATING?: PATIENT DECLINED

## 2021-09-10 ASSESSMENT — PATIENT HEALTH QUESTIONNAIRE - PHQ9
SUM OF ALL RESPONSES TO PHQ QUESTIONS 1-9: 0
SUM OF ALL RESPONSES TO PHQ QUESTIONS 1-9: 0
1. LITTLE INTEREST OR PLEASURE IN DOING THINGS: 0
2. FEELING DOWN, DEPRESSED OR HOPELESS: 0
SUM OF ALL RESPONSES TO PHQ9 QUESTIONS 1 & 2: 0
SUM OF ALL RESPONSES TO PHQ QUESTIONS 1-9: 0

## 2021-09-10 NOTE — PATIENT INSTRUCTIONS
sad or hopeless, talk with your doctor. Treatment can help. · Talk to your doctor about whether you have any risk factors for sexually transmitted infections (STIs). You can help prevent STIs if you wait to have sex with a new partner (or partners) until you've each been tested for STIs. It also helps if you use condoms (male or female condoms) and if you limit your sex partners to one person who only has sex with you. Vaccines are available for some STIs, such as HPV. · Use birth control if it's important to you to prevent pregnancy. Talk with your doctor about the choices available and what might be best for you. · If you think you may have a problem with alcohol or drug use, talk to your doctor. This includes prescription medicines (such as amphetamines and opioids) and illegal drugs (such as cocaine and methamphetamine). Your doctor can help you figure out what type of treatment is best for you. · Protect your skin from too much sun. When you're outdoors from 10 a.m. to 4 p.m., stay in the shade or cover up with clothing and a hat with a wide brim. Wear sunglasses that block UV rays. Even when it's cloudy, put broad-spectrum sunscreen (SPF 30 or higher) on any exposed skin. · See a dentist one or two times a year for checkups and to have your teeth cleaned. · Wear a seat belt in the car. When should you call for help? Watch closely for changes in your health, and be sure to contact your doctor if you have any problems or symptoms that concern you. Where can you learn more? Go to https://maddie.[employer]. org and sign in to your [employer] account. Enter P072 in the Guruji box to learn more about \"Well Visit, Ages 25 to 48: Care Instructions. \"     If you do not have an account, please click on the \"Sign Up Now\" link. Current as of: May 27, 2020               Content Version: 12.9  © 0862-3044 [employer]. Care instructions adapted under license by [facility] ([facility]). If you have questions about a medical condition or this instruction, always ask your healthcare professional. [place] [address] any warranty or liability for your use of this information.

## 2021-09-10 NOTE — PROGRESS NOTES
Chief Complaint   Patient presents with    Annual Exam     Here today for annual exam.        Anselmo Mosqueda is a 42 y. o.female    Pt presents for annual wellness physical exam.        Pt stable since last visit- no new problems for diagnoses listed below:    Patient Active Problem List   Diagnosis    IBS (irritable bowel syndrome)    Internal hemorrhoids    GERD (gastroesophageal reflux disease)    Morbid obesity ([address]    Tobacco dependence    DUB (dysfunctional uterine bleeding)     Follows with Dermatology and Allergist. Not currently on allergy injections. Pt had Rt Rotator surgery in July with Dr Naheed Rebolledo. Still doing therapy but feels her ROM is improving. Review of Systems   Constitutional: Negative for chills, diaphoresis and fever. Respiratory: Negative for shortness of breath. Cardiovascular: Negative for chest pain, palpitations and leg swelling. Gastrointestinal: Negative for blood in stool, constipation, diarrhea, nausea and vomiting. Genitourinary: Negative for dysuria and hematuria. Musculoskeletal: Negative for myalgias. Neurological: Negative for dizziness and headaches. All other systems reviewed and are negative. OBJECTIVE     /82 (Site: Left Upper Arm, Cuff Size: Large Adult)   Pulse 88   Temp 98.3 °F (36.8 °C) (Oral)   Resp 16   Ht 5' 7.5\" (1.715 m)   Wt (!) 342 lb (155.1 kg)   BMI 52.77 kg/m²     Wt Readings from Last 3 Encounters:   09/10/21 (!) 342 lb (155.1 kg)   09/14/20 (!) 304 lb (137.9 kg)   09/10/20 (!) 307 lb 3.2 oz (139.3 kg)       Physical Exam  Vitals and nursing note reviewed. Constitutional:       Appearance: She is well-developed. She is obese. HENT:      Head: Normocephalic and atraumatic. Right Ear: External ear normal.      Left Ear: External ear normal.      Nose: Nose normal.   Eyes:      Conjunctiva/sclera: Conjunctivae normal.      Pupils: Pupils are equal, round, and reactive to light. Cardiovascular:      Rate and Rhythm: Normal rate and regular rhythm. Heart sounds: Normal heart sounds. Pulmonary:      Effort: Pulmonary effort is normal.      Breath sounds: Normal breath sounds. Abdominal:      General: Bowel sounds are normal.      Palpations: Abdomen is soft. Musculoskeletal:         General: Normal range of motion. Cervical back: Normal range of motion and neck supple. Skin:     General: Skin is warm and dry. Neurological:      Mental Status: She is alert and oriented to person, place, and time. Deep Tendon Reflexes: Reflexes are normal and symmetric. Psychiatric:         Behavior: Behavior normal.         Thought Content:  Thought content normal.         Judgment: Judgment normal.           Immunization History   Administered Date(s) Administered    COVID-19, Moderna, PF, 100mcg/0.5mL 04/02/2021, 04/30/2021    Influenza Virus Vaccine 11/01/2012, 12/05/2013, 12/02/2014, 09/30/2015, 11/15/2016    Influenza Whole 11/25/2011    Influenza, Quadv, IM, (6 mo and older Fluzone, Flulaval, Fluarix and 3 yrs and older Afluria) 12/19/2018    Influenza, Quadv, IM, PF (6 mo and older Fluzone, Flulaval, Fluarix, and 3 yrs and older Afluria) 12/31/2019    Influenza, Triv, 3 Years and older, IM (Afluria (5 yrs and older) 11/15/2016    Td, unspecified formulation 01/01/1995    Tdap (Boostrix, Adacel) 07/23/2012         Health Maintenance   Topic Date Due    Hepatitis C screen  Never done    HIV screen  Never done    Diabetes screen  Never done    Potassium monitoring  08/08/2019    Creatinine monitoring  08/08/2019    Flu vaccine (1) 09/01/2021    Lipid screen  03/03/2022    DTaP/Tdap/Td vaccine (3 - Td or Tdap) 07/23/2022    COVID-19 Vaccine  Completed    Hepatitis A vaccine  Aged Out    Hepatitis B vaccine  Aged Out    Hib vaccine  Aged Out    Meningococcal (ACWY) vaccine  Aged Out    Pneumococcal 0-64 years Vaccine  Aged Out         ASSESSMENT Diagnosis Orders   1. Well adult exam  Lipid Panel    Comprehensive Metabolic Panel    CBC Auto Differential    T4, Free    TSH without Reflex   2. Screening, lipid     3. Morbid obesity ([address]  [facility]       PLAN        Orders Placed This Encounter   Procedures    Lipid Panel     Standing Status:   Future     Standing Expiration Date:   9/10/2022     Order Specific Question:   Is Patient Fasting?/# of Hours     Answer:   yes/12    Comprehensive Metabolic Panel     Standing Status:   Future     Standing Expiration Date:   9/10/2022    CBC Auto Differential     Standing Status:   Future     Standing Expiration Date:   9/10/2022    T4, Free     Standing Status:   Future     Standing Expiration Date:   9/10/2022    TSH without Reflex     Standing Status:   Future     Standing Expiration Date:   9/10/2022   [facility]     Referral Priority:   Routine     Referral Type:   Eval and Treat     Referral Reason:   Specialty Services Required     Requested Specialty:   Bariatrics     Number of Visits Requested:   1       Requested Prescriptions     Signed Prescriptions Disp Refills    SUMAtriptan (IMITREX) 100 MG tablet 9 tablet 5     Sig: Take 1 tablet by mouth once as needed for Migraine    cetirizine (ZYRTEC) 10 MG tablet 90 tablet 3     Sig: TAKE 1 TABLET BY MOUTH EVERY DAY    montelukast (SINGULAIR) 10 MG tablet 90 tablet 3     Sig: TAKE 1 TABLET BY MOUTH EVERY DAY AT NIGHT    polycarbophil (FIBERCON) 625 MG tablet 360 tablet 3     Sig: Take tablets twice a day     Encouraged annual FLU VACCINE after October 1st.    Mammo due after 11/25/21  Pap/pelvic management per MAME Mchugh - Last appt June 29th 2021  Continue to work on diet, exercise (30 minutes, 5x/week), and weight loss for optimal cardiovascular health  Labs - as above  Continue current medications  Refills sent  Follow up annually and as needed      Electronically signed by Lennox Crawley JASSON Snow - CNP on 9/15/2021 at 10:14 AM

## 2021-09-15 ASSESSMENT — ENCOUNTER SYMPTOMS
BLOOD IN STOOL: 0
NAUSEA: 0
VOMITING: 0
SHORTNESS OF BREATH: 0
CONSTIPATION: 0
DIARRHEA: 0

## 2021-12-20 ENCOUNTER — HOSPITAL ENCOUNTER (OUTPATIENT)
Age: 46
Discharge: HOME | End: 2021-12-20
Payer: COMMERCIAL

## 2021-12-20 DIAGNOSIS — Z00.00 WELL ADULT EXAM: ICD-10-CM

## 2021-12-20 LAB
BASOPHILS # BLD: 0.7 %
BASOPHILS ABSOLUTE: 0.1 THOU/MM3 (ref 0–0.1)
EOSINOPHIL # BLD: 4.2 %
EOSINOPHILS ABSOLUTE: 0.3 THOU/MM3 (ref 0–0.4)
ERYTHROCYTE [DISTWIDTH] IN BLOOD BY AUTOMATED COUNT: 13.9 % (ref 11.5–14.5)
ERYTHROCYTE [DISTWIDTH] IN BLOOD BY AUTOMATED COUNT: 46.2 FL (ref 35–45)
HCT VFR BLD CALC: 43 % (ref 37–47)
HEMOGLOBIN: 13.5 GM/DL (ref 12–16)
IMMATURE GRANS (ABS): 0.03 THOU/MM3 (ref 0–0.07)
IMMATURE GRANULOCYTES: 0.4 %
LYMPHOCYTES # BLD: 30.1 %
LYMPHOCYTES ABSOLUTE: 2.5 THOU/MM3 (ref 1–4.8)
MCH RBC QN AUTO: 28.5 PG (ref 26–33)
MCHC RBC AUTO-ENTMCNC: 31.4 GM/DL (ref 32.2–35.5)
MCV RBC AUTO: 90.7 FL (ref 81–99)
MONOCYTES # BLD: 6.3 %
MONOCYTES ABSOLUTE: 0.5 THOU/MM3 (ref 0.4–1.3)
NUCLEATED RED BLOOD CELLS: 0 /100 WBC
PLATELET # BLD: 299 THOU/MM3 (ref 130–400)
PMV BLD AUTO: 10.6 FL (ref 9.4–12.4)
RBC # BLD: 4.74 MILL/MM3 (ref 4.2–5.4)
SEG NEUTROPHILS: 58.3 %
SEGMENTED NEUTROPHILS ABSOLUTE COUNT: 4.8 THOU/MM3 (ref 1.8–7.7)
WBC # BLD: 8.2 THOU/MM3 (ref 4.8–10.8)

## 2021-12-20 PROCEDURE — 84439 ASSAY OF FREE THYROXINE: CPT

## 2021-12-20 PROCEDURE — 84443 ASSAY THYROID STIM HORMONE: CPT

## 2021-12-20 PROCEDURE — 85025 COMPLETE CBC W/AUTO DIFF WBC: CPT

## 2021-12-20 PROCEDURE — 80053 COMPREHEN METABOLIC PANEL: CPT

## 2021-12-20 PROCEDURE — 36415 COLL VENOUS BLD VENIPUNCTURE: CPT

## 2021-12-20 PROCEDURE — 80061 LIPID PANEL: CPT

## 2021-12-21 LAB
ALBUMIN SERPL-MCNC: 4.3 G/DL (ref 3.5–5.1)
ALP BLD-CCNC: 121 U/L (ref 38–126)
ALT SERPL-CCNC: 33 U/L (ref 11–66)
ANION GAP SERPL CALCULATED.3IONS-SCNC: 16 MEQ/L (ref 8–16)
AST SERPL-CCNC: 28 U/L (ref 5–40)
BILIRUB SERPL-MCNC: 0.3 MG/DL (ref 0.3–1.2)
BUN BLDV-MCNC: 11 MG/DL (ref 7–22)
CALCIUM SERPL-MCNC: 9.6 MG/DL (ref 8.5–10.5)
CHLORIDE BLD-SCNC: 103 MEQ/L (ref 98–111)
CHOLESTEROL, TOTAL: 228 MG/DL (ref 100–199)
CO2: 25 MEQ/L (ref 23–33)
CREAT SERPL-MCNC: 0.6 MG/DL (ref 0.4–1.2)
GFR SERPL CREATININE-BSD FRML MDRD: > 90 ML/MIN/1.73M2
GLUCOSE BLD-MCNC: 97 MG/DL (ref 70–108)
HDLC SERPL-MCNC: 42 MG/DL
LDL CHOLESTEROL CALCULATED: 160 MG/DL
POTASSIUM SERPL-SCNC: 4.6 MEQ/L (ref 3.5–5.2)
SODIUM BLD-SCNC: 144 MEQ/L (ref 135–145)
T4 FREE: 0.92 NG/DL (ref 0.93–1.76)
TOTAL PROTEIN: 6.9 G/DL (ref 6.1–8)
TRIGL SERPL-MCNC: 128 MG/DL (ref 0–199)
TSH SERPL DL<=0.05 MIU/L-ACNC: 1.26 UIU/ML (ref 0.4–4.2)

## 2021-12-28 ENCOUNTER — E-VISIT (OUTPATIENT)
Dept: OTHER | Facility: CLINIC | Age: 46
End: 2021-12-28
Payer: COMMERCIAL

## 2021-12-28 ENCOUNTER — OFFICE VISIT (OUTPATIENT)
Dept: FAMILY MEDICINE CLINIC | Age: 46
End: 2021-12-28
Payer: COMMERCIAL

## 2021-12-28 VITALS
RESPIRATION RATE: 18 BRPM | BODY MASS INDEX: 44.41 KG/M2 | HEIGHT: 68 IN | SYSTOLIC BLOOD PRESSURE: 132 MMHG | DIASTOLIC BLOOD PRESSURE: 84 MMHG | TEMPERATURE: 97.1 F | HEART RATE: 94 BPM | OXYGEN SATURATION: 99 % | WEIGHT: 293 LBS

## 2021-12-28 DIAGNOSIS — J06.9 UPPER RESPIRATORY TRACT INFECTION, UNSPECIFIED TYPE: Primary | ICD-10-CM

## 2021-12-28 DIAGNOSIS — U07.1 ACUTE COVID-19: Primary | ICD-10-CM

## 2021-12-28 LAB
Lab: ABNORMAL
PERFORMING INSTRUMENT: ABNORMAL
QC PASS/FAIL: ABNORMAL
SARS-COV-2, POC: DETECTED

## 2021-12-28 PROCEDURE — 87426 SARSCOV CORONAVIRUS AG IA: CPT | Performed by: FAMILY MEDICINE

## 2021-12-28 PROCEDURE — 99422 OL DIG E/M SVC 11-20 MIN: CPT | Performed by: NURSE PRACTITIONER

## 2021-12-28 PROCEDURE — G8417 CALC BMI ABV UP PARAM F/U: HCPCS | Performed by: FAMILY MEDICINE

## 2021-12-28 PROCEDURE — 1036F TOBACCO NON-USER: CPT | Performed by: FAMILY MEDICINE

## 2021-12-28 PROCEDURE — 99214 OFFICE O/P EST MOD 30 MIN: CPT | Performed by: FAMILY MEDICINE

## 2021-12-28 PROCEDURE — G8484 FLU IMMUNIZE NO ADMIN: HCPCS | Performed by: FAMILY MEDICINE

## 2021-12-28 PROCEDURE — G8427 DOCREV CUR MEDS BY ELIG CLIN: HCPCS | Performed by: FAMILY MEDICINE

## 2021-12-28 RX ORDER — PREDNISONE 20 MG/1
40 TABLET ORAL DAILY
Qty: 14 TABLET | Refills: 0 | Status: SHIPPED | OUTPATIENT
Start: 2021-12-28 | End: 2022-01-04

## 2022-01-03 ENCOUNTER — TELEPHONE (OUTPATIENT)
Dept: FAMILY MEDICINE CLINIC | Age: 47
End: 2022-01-03

## 2022-01-03 DIAGNOSIS — R94.6 ABNORMAL THYROID FUNCTION TEST: Primary | ICD-10-CM

## 2022-01-03 NOTE — TELEPHONE ENCOUNTER
----- Message from JASSON Tatum CNP sent at 12/21/2021 10:24 AM EST -----  CMP looks good  Lipid panel shows and elevated total cholesterol and LDL. Would recommend increasing physical activity and following low fat diet  TSH is normal but free T4 is slightly low. Would recheck in 3-6 months and have her hold biotin for a few weeks prior if she is taking this. CBC looks good.      Thanks, TS        The 10-year ASCVD risk score (Tim Hartman, et al., 2013) is: 1.5%    Values used to calculate the score:      Age: 42 years      Sex: Female      Is Non- : No      Diabetic: No      Tobacco smoker: No      Systolic Blood Pressure: 560 mmHg      Is BP treated: No      HDL Cholesterol: 42 mg/dL      Total Cholesterol: 228 mg/dL

## 2022-01-03 NOTE — TELEPHONE ENCOUNTER
Pt notified of lab results and TS recommendations and she verbalized understanding. She takes a hair/skin/nail vitamin but will stop taking it 2 weeks prior to repeat labs. Lab order mailed to pt.

## 2022-01-05 ENCOUNTER — PATIENT MESSAGE (OUTPATIENT)
Dept: FAMILY MEDICINE CLINIC | Age: 47
End: 2022-01-05

## 2022-01-05 NOTE — TELEPHONE ENCOUNTER
From: Bessie Watts  To: Dr. Shelton Angry: 1/5/2022 12:43 PM EST  Subject: Question regarding POCT Covid-19, Antigen    Do I need to retest at the end of the 10 days? Feeling better but headache and tired still there but not as bad as it was. Thank you!

## 2022-01-05 NOTE — TELEPHONE ENCOUNTER
From: Myriam Escalante  To: Lisa David  Sent: 1/5/2022 12:42 PM EST  Subject: Question regarding COMPREHENSIVE METABOLIC PANEL    Can you send this result Comprehensive and CBC to Dr Nic Velasquez office at fax [phone]? It was on a separate order but they must have missed it. Thank you!

## 2022-01-13 RX ORDER — AMOXICILLIN AND CLAVULANATE POTASSIUM 875; 125 MG/1; MG/1
1 TABLET, FILM COATED ORAL 2 TIMES DAILY
Qty: 20 TABLET | Refills: 0 | Status: SHIPPED | OUTPATIENT
Start: 2022-01-13 | End: 2022-01-23

## 2022-01-13 NOTE — TELEPHONE ENCOUNTER
Okay for Augmentin 875 mg - 1 pill twice daily x10 days (#20/no refills)  Please send to pharmacy of choice.   ES

## 2022-02-11 ENCOUNTER — TELEPHONE (OUTPATIENT)
Dept: ALLERGY | Age: 47
End: 2022-02-11

## 2022-02-11 NOTE — TELEPHONE ENCOUNTER
Patient has not been into the office for over 1 year. Allergy injection chart disassembled and scanned into media.

## 2022-02-16 ENCOUNTER — TELEPHONE (OUTPATIENT)
Dept: FAMILY MEDICINE CLINIC | Age: 47
End: 2022-02-16

## 2022-02-16 NOTE — TELEPHONE ENCOUNTER
Last Tdap 7/23/2012- would recommend updating at this time. [id number] for in-office immunization if desired.   ES

## 2022-02-16 NOTE — TELEPHONE ENCOUNTER
Pt  Notified, she will check with her insurance and complete here if okay.  She will call back to schedule

## 2022-02-16 NOTE — TELEPHONE ENCOUNTER
----- Message from [employer] sent at 2/15/2022  7:02 PM EST -----  Subject: Message to Provider    QUESTIONS  Information for Provider? Pt wants to know when was her last tetanus   shot,, she stepped on a nail an hour ago. please call her at [phone]  ---------------------------------------------------------------------------  --------------  CALL BACK INFO  What is the best way for the office to contact you? OK to leave message on   voicemail  Preferred Call Back Phone Number? [phone]  ---------------------------------------------------------------------------  --------------  SCRIPT ANSWERS  Relationship to Patient?  Self

## 2022-06-15 RX ORDER — SUMATRIPTAN 100 MG/1
100 TABLET, FILM COATED ORAL
Qty: 9 TABLET | Refills: 5 | Status: SHIPPED | OUTPATIENT
Start: 2022-06-15 | End: 2022-09-23

## 2022-06-15 NOTE — TELEPHONE ENCOUNTER
This medication refill is regarding a electronic request.  Refill requested by [place], [place]. Requested Prescriptions     Pending Prescriptions Disp Refills    SUMAtriptan (IMITREX) 100 MG tablet [Pharmacy Med Name: SUMATRIPTAN SUCC 100 MG TABLET] 9 tablet 5     Sig: TAKE 1 TABLET BY MOUTH ONCE AS NEEDED FOR MIGRAINE     Date of last visit: 9/10/2021   Date of next visit: None  Date of last refill: 9/10/21 #9/5    Rx verified, ordered and set to EP.

## 2022-09-09 RX ORDER — MONTELUKAST SODIUM 10 MG/1
TABLET ORAL
Qty: 90 TABLET | Refills: 0 | Status: SHIPPED | OUTPATIENT
Start: 2022-09-09 | End: 2022-11-30

## 2022-09-09 RX ORDER — OMEPRAZOLE 40 MG/1
CAPSULE, DELAYED RELEASE ORAL
Qty: 90 CAPSULE | Refills: 0 | Status: SHIPPED | OUTPATIENT
Start: 2022-09-09

## 2022-09-09 RX ORDER — CETIRIZINE HYDROCHLORIDE 10 MG/1
TABLET ORAL
Qty: 90 TABLET | Refills: 0 | Status: SHIPPED | OUTPATIENT
Start: 2022-09-09 | End: 2022-11-30

## 2022-09-09 RX ORDER — ESCITALOPRAM OXALATE 20 MG/1
TABLET ORAL
Qty: 90 TABLET | Refills: 0 | Status: SHIPPED | OUTPATIENT
Start: 2022-09-09

## 2022-09-09 RX ORDER — LORATADINE 10 MG/1
TABLET ORAL
Qty: 90 TABLET | Refills: 3 | OUTPATIENT
Start: 2022-09-09

## 2022-09-09 NOTE — TELEPHONE ENCOUNTER
OK for refill for 90 days only    Patient due for appointment at this time-resident schedule okay-please schedule.   ES

## 2022-09-09 NOTE — TELEPHONE ENCOUNTER
ES still listed as PCP and pt has no current appt with our office. Please contact the pt to schedule an appt.

## 2022-09-09 NOTE — TELEPHONE ENCOUNTER
Okay for refill for 90 days. Patient due for appointment-to be scheduled, as other prescriptions due at this time also.   ES

## 2022-09-12 ENCOUNTER — TELEPHONE (OUTPATIENT)
Dept: FAMILY MEDICINE CLINIC | Age: 47
End: 2022-09-12

## 2022-09-12 DIAGNOSIS — Z00.00 WELL ADULT EXAM: Primary | ICD-10-CM

## 2022-09-12 DIAGNOSIS — Z13.220 SCREENING, LIPID: ICD-10-CM

## 2022-09-12 DIAGNOSIS — Z13.1 SCREENING FOR DIABETES MELLITUS: ICD-10-CM

## 2022-09-12 RX ORDER — LORATADINE 10 MG/1
TABLET ORAL
Qty: 90 TABLET | Refills: 3 | OUTPATIENT
Start: 2022-09-12

## 2022-09-12 NOTE — TELEPHONE ENCOUNTER
Walter Ellison returned call and states that she would like to follow Jie Quintero CNP. I informed her that she would need to call her office to schedule and she voiced understanding.

## 2022-09-12 NOTE — TELEPHONE ENCOUNTER
Annual labs pended. These are couple months earlier than last year so she may want to double check with her insurance to see if they can be completed anytime within the new calendar year or if it must be a full year.  Thanks, TS

## 2022-09-12 NOTE — TELEPHONE ENCOUNTER
----- Message from Robertjuliano Torres sent at 9/12/2022 10:58 AM EDT -----  Subject: Referral Request    Reason for referral request? lab work for physical  Provider patient wants to be referred to(if known):     Provider Phone Number(if known): Additional Information for Provider?  please advise pt when ready, pt has   appt on 9/20/22 at 10:30am  ---------------------------------------------------------------------------  --------------  [id number]    [phone]; OK to leave message on voicemail  ---------------------------------------------------------------------------  --------------

## 2022-09-12 NOTE — TELEPHONE ENCOUNTER
Please advise if any labs need done prior to her appt with TS on 9/20/22.  She is scheduled for an annual exam.

## 2022-09-13 NOTE — TELEPHONE ENCOUNTER
Spoke to pt and notified her of TS response. She will check with her insurance and let the office know.

## 2022-09-14 RX ORDER — LORATADINE 10 MG/1
TABLET ORAL
Qty: 90 TABLET | Refills: 3 | Status: SHIPPED | OUTPATIENT
Start: 2022-09-14

## 2022-09-14 NOTE — TELEPHONE ENCOUNTER
This medication refill is regarding a electronic request.  Refill requested by  [place], [place] . Requested Prescriptions     Pending Prescriptions Disp Refills    loratadine (CLARITIN) 10 MG tablet [Pharmacy Med Name: LORATADINE 10 MG TABLET] 90 tablet 3     Sig: TAKE 1 TABLET BY MOUTH EVERY DAY     Date of last visit: 9/10/2021   Date of next visit: 9/20/22  Date of last refill: 8/31/21 #90/3    Rx verified, ordered and set to EP.

## 2022-09-16 ENCOUNTER — HOSPITAL ENCOUNTER (OUTPATIENT)
Dept: MAMMOGRAPHY | Age: 47
Discharge: HOME | End: 2022-09-16
Payer: COMMERCIAL

## 2022-09-16 DIAGNOSIS — Z12.39 BREAST SCREENING: ICD-10-CM

## 2022-09-16 PROCEDURE — 77063 BREAST TOMOSYNTHESIS BI: CPT

## 2022-09-23 ENCOUNTER — OFFICE VISIT (OUTPATIENT)
Dept: FAMILY MEDICINE CLINIC | Age: 47
End: 2022-09-23
Payer: COMMERCIAL

## 2022-09-23 VITALS
RESPIRATION RATE: 16 BRPM | SYSTOLIC BLOOD PRESSURE: 136 MMHG | BODY MASS INDEX: 44.41 KG/M2 | HEIGHT: 68 IN | DIASTOLIC BLOOD PRESSURE: 80 MMHG | HEART RATE: 72 BPM | WEIGHT: 293 LBS

## 2022-09-23 DIAGNOSIS — Z13.220 SCREENING, LIPID: ICD-10-CM

## 2022-09-23 DIAGNOSIS — Z00.00 WELL ADULT EXAM: Primary | ICD-10-CM

## 2022-09-23 DIAGNOSIS — R00.2 PALPITATION: ICD-10-CM

## 2022-09-23 DIAGNOSIS — R06.02 SOB (SHORTNESS OF BREATH) ON EXERTION: ICD-10-CM

## 2022-09-23 DIAGNOSIS — R00.0 TACHYCARDIA: ICD-10-CM

## 2022-09-23 DIAGNOSIS — Z13.1 SCREENING FOR DIABETES MELLITUS: ICD-10-CM

## 2022-09-23 DIAGNOSIS — Z86.16 HISTORY OF COVID-19: ICD-10-CM

## 2022-09-23 DIAGNOSIS — R53.83 FATIGUE, UNSPECIFIED TYPE: ICD-10-CM

## 2022-09-23 DIAGNOSIS — E66.01 MORBID OBESITY (HCC): ICD-10-CM

## 2022-09-23 PROCEDURE — 93000 ELECTROCARDIOGRAM COMPLETE: CPT | Performed by: NURSE PRACTITIONER

## 2022-09-23 PROCEDURE — 99396 PREV VISIT EST AGE 40-64: CPT | Performed by: NURSE PRACTITIONER

## 2022-09-23 RX ORDER — CHOLECALCIFEROL (VITAMIN D3) 50 MCG
10 CAPSULE ORAL DAILY
COMMUNITY
Start: 2022-09-09

## 2022-09-23 RX ORDER — ESTROGENS, CONJUGATED 0.9 MG
TABLET ORAL
COMMUNITY
Start: 2022-09-19

## 2022-09-23 SDOH — ECONOMIC STABILITY: FOOD INSECURITY: WITHIN THE PAST 12 MONTHS, THE FOOD YOU BOUGHT JUST DIDN'T LAST AND YOU DIDN'T HAVE MONEY TO GET MORE.: NEVER TRUE

## 2022-09-23 SDOH — ECONOMIC STABILITY: FOOD INSECURITY: WITHIN THE PAST 12 MONTHS, YOU WORRIED THAT YOUR FOOD WOULD RUN OUT BEFORE YOU GOT MONEY TO BUY MORE.: NEVER TRUE

## 2022-09-23 ASSESSMENT — PATIENT HEALTH QUESTIONNAIRE - PHQ9
SUM OF ALL RESPONSES TO PHQ QUESTIONS 1-9: 0
SUM OF ALL RESPONSES TO PHQ QUESTIONS 1-9: 0
2. FEELING DOWN, DEPRESSED OR HOPELESS: 0
SUM OF ALL RESPONSES TO PHQ QUESTIONS 1-9: 0
SUM OF ALL RESPONSES TO PHQ9 QUESTIONS 1 & 2: 0
1. LITTLE INTEREST OR PLEASURE IN DOING THINGS: 0
SUM OF ALL RESPONSES TO PHQ QUESTIONS 1-9: 0

## 2022-09-23 ASSESSMENT — ENCOUNTER SYMPTOMS
DIARRHEA: 0
NAUSEA: 0
CONSTIPATION: 0
BLOOD IN STOOL: 0
VOMITING: 0

## 2022-09-23 ASSESSMENT — SOCIAL DETERMINANTS OF HEALTH (SDOH): HOW HARD IS IT FOR YOU TO PAY FOR THE VERY BASICS LIKE FOOD, HOUSING, MEDICAL CARE, AND HEATING?: NOT HARD AT ALL

## 2022-09-23 NOTE — PROGRESS NOTES
Chief Complaint   Patient presents with    Annual Exam     Pt would like to discuss her blood pressure. Skin Problem     Pt has a spot on the back of her head that she would like looked at. Yoel Sneed is a 41 y. o.female    Pt presents for annual wellness physical exam.        Pt stable since last visit- no new problems for diagnoses listed below:    Patient Active Problem List   Diagnosis    IBS (irritable bowel syndrome)    Internal hemorrhoids    GERD (gastroesophageal reflux disease)    Morbid obesity ([address]    Tobacco dependence    DUB (dysfunctional uterine bleeding)     Had covid last year and received covid vaccine last April as well. Since having covid she has noticed increased sob with minimal activity, increased BP, fatigue, increased HR, occasional palpitations. Review of Systems   Constitutional:  Positive for fatigue. Negative for chills, diaphoresis and fever. Respiratory:  Positive for shortness of breath (with activity). Cardiovascular:  Positive for palpitations. Negative for chest pain and leg swelling. Gastrointestinal:  Negative for blood in stool, constipation, diarrhea, nausea and vomiting. Genitourinary:  Negative for dysuria and hematuria. Musculoskeletal:  Negative for myalgias. Neurological:  Negative for dizziness and headaches. All other systems reviewed and are negative. OBJECTIVE     /80   Pulse 72   Resp 16   Ht 5' 8\" (1.727 m)   Wt (!) 355 lb (161 kg)   BMI 53.98 kg/m²     Wt Readings from Last 3 Encounters:   09/23/22 (!) 355 lb (161 kg)   12/28/21 (!) 342 lb 6.4 oz (155.3 kg)   09/10/21 (!) 342 lb (155.1 kg)       Physical Exam  Vitals and nursing note reviewed. Constitutional:       Appearance: She is well-developed. She is obese. HENT:      Head: Normocephalic and atraumatic.       Right Ear: External ear normal.      Left Ear: External ear normal.      Nose: Nose normal.   Eyes:      Conjunctiva/sclera: Conjunctivae normal.      Pupils: Pupils are equal, round, and reactive to light. Cardiovascular:      Rate and Rhythm: Normal rate and regular rhythm. Heart sounds: Normal heart sounds. Pulmonary:      Effort: Pulmonary effort is normal.      Breath sounds: Normal breath sounds. Abdominal:      General: Bowel sounds are normal.      Palpations: Abdomen is soft. Musculoskeletal:         General: Normal range of motion. Cervical back: Normal range of motion and neck supple. Skin:     General: Skin is warm and dry. Neurological:      Mental Status: She is alert and oriented to person, place, and time. Deep Tendon Reflexes: Reflexes are normal and symmetric. Psychiatric:         Behavior: Behavior normal.         Thought Content:  Thought content normal.         Judgment: Judgment normal.         Immunization History   Administered Date(s) Administered    COVID-19, MODERNA BLUE border, Primary or Immunocompromised, (age 12y+), IM, 100 mcg/0.5mL 04/02/2021, 04/30/2021    Influenza Virus Vaccine 11/01/2012, 12/05/2013, 12/02/2014, 09/30/2015, 11/15/2016, 12/22/2017, 10/24/2020, 02/16/2022    Influenza Whole 11/25/2011    Influenza, AFLURIA (age 1 yrs+), FLUZONE, (age 10 mo+), MDV, 0.5mL 12/19/2018    Influenza, FLUARIX, FLULAVAL, FLUZONE (age 10 mo+) AND AFLURIA, (age 1 y+), PF, 0.5mL 12/31/2019, 10/19/2020, 02/16/2022    Influenza, Triv, 3 Years and older, IM (Afluria (5 yrs and older) 11/15/2016    Td, unspecified formulation 01/01/1995    Tdap (Boostrix, Adacel) 07/23/2012, 02/16/2022         Health Maintenance   Topic Date Due    HIV screen  Never done    Hepatitis C screen  Never done    Cervical cancer screen  Never done    COVID-19 Vaccine (3 - Booster for Moderna series) 09/30/2021    Flu vaccine (1) 08/01/2022    Depression Screen  09/23/2023    Colorectal Cancer Screen  08/05/2026    Lipids  12/20/2026    DTaP/Tdap/Td vaccine (4 - Td or Tdap) 02/16/2032    Hepatitis A vaccine  Aged Out Hepatitis B vaccine  Aged Out    Hib vaccine  Aged Out    Meningococcal (ACWY) vaccine  Aged Out    Pneumococcal 0-64 years Vaccine  Aged Out         ASSESSMENT       Diagnosis Orders   1. Well adult exam  CBC with Auto Differential    T4, Free    TSH      2. Tachycardia  ECHO Complete 2D W Doppler W Color    Cardiac event monitor    EKG 12 Lead      3. SOB (shortness of breath) on exertion  ECHO Complete 2D W Doppler W Color    Cardiac event monitor    EKG 12 Lead    XR CHEST (2 VW)      4. Fatigue, unspecified type  ECHO Complete 2D W Doppler W Color    Cardiac event monitor    EKG 12 Lead      5. History of COVID-19  ECHO Complete 2D W Doppler W Color    Cardiac event monitor    EKG 12 Lead    XR CHEST (2 VW)      6. Palpitation  ECHO Complete 2D W Doppler W Color    Cardiac event monitor    EKG 12 Lead      7. Screening, lipid  Comprehensive Metabolic Panel    Lipid Panel      8. Screening for diabetes mellitus        9.  Morbid obesity ([address]  [facility]            PLAN        Orders Placed This Encounter   Procedures    XR CHEST (2 VW)     Standing Status:   Future     Standing Expiration Date:   9/23/2023     Order Specific Question:   Reason for exam:     Answer:   Diaz Agent with exertion    Comprehensive Metabolic Panel     Standing Status:   Future     Standing Expiration Date:   9/23/2023    CBC with Auto Differential     Standing Status:   Future     Standing Expiration Date:   9/23/2023    Lipid Panel     Standing Status:   Future     Standing Expiration Date:   9/23/2023     Order Specific Question:   Is Patient Fasting?/# of Hours     Answer:   yes/12    T4, Free     Standing Status:   Future     Standing Expiration Date:   9/23/2023    TSH     Standing Status:   Future     Standing Expiration Date:   9/23/2023    [facility]     Referral Priority:   Routine     Referral Type:   Eval and Treat     Referral Reason:   Specialty Services Required     Requested Specialty:   Bariatrics     Number of Visits Requested:   1    Cardiac event monitor     1 week    EKG 12 Lead     Order Specific Question:   Reason for Exam?     Answer:   Shortness of Breath    ECHO Complete 2D W Doppler W Color     Standing Status:   Future     Standing Expiration Date:   9/23/2023     Order Specific Question:   Reason for exam:     Answer:   Covid Dec 2021. Then started with ongoinf palpitations, fatigue, sob, tachycardia         Requested Prescriptions     Signed Prescriptions Disp Refills    Ketoconazole 2 % GEL 45 g 0     Sig: Apply topically to affected area twice daily     Encouraged annual FLU VACCINE after October 1st.    Colonoscopy completed 2016. Unsure when next is due. Mammo due after 9/16/22  Pap/pelvic management per [facility] Mchugh   Continue to work on diet, exercise (30 minutes, 5x/week), and weight loss for optimal cardiovascular health  Cardiac workup for sob, palpitations, fatigue as above  Referral to weight management per her request  Labs - as above  Continue current medications  Refills sent  Follow up annually and as needed      Electronically signed by JASSON Mclean CNP on 10/2/2022 at 6:47 PM

## 2022-09-26 ENCOUNTER — TELEPHONE (OUTPATIENT)
Dept: FAMILY MEDICINE CLINIC | Age: 47
End: 2022-09-26

## 2022-09-26 NOTE — TELEPHONE ENCOUNTER
I believe the patient already has the cream. Wanted something that would be easier to apply to her scalp due to her hair. Please double check with her. Can they order it or I could send it to a different pharmacy.  TS

## 2022-09-26 NOTE — TELEPHONE ENCOUNTER
Pharmacy called stating that the ketoconazole gel is out of stock. Is it okay to switch it to the cream 2%?

## 2022-10-02 ASSESSMENT — ENCOUNTER SYMPTOMS: SHORTNESS OF BREATH: 1

## 2022-10-04 ENCOUNTER — HOSPITAL ENCOUNTER (OUTPATIENT)
Dept: NON INVASIVE DIAGNOSTICS | Age: 47
Discharge: HOME | End: 2022-10-04
Payer: COMMERCIAL

## 2022-10-04 ENCOUNTER — HOSPITAL ENCOUNTER (OUTPATIENT)
Age: 47
Discharge: HOME | End: 2022-10-04
Payer: COMMERCIAL

## 2022-10-04 ENCOUNTER — HOSPITAL ENCOUNTER (OUTPATIENT)
Dept: GENERAL RADIOLOGY | Age: 47
Discharge: HOME | End: 2022-10-04
Payer: COMMERCIAL

## 2022-10-04 ENCOUNTER — TELEPHONE (OUTPATIENT)
Dept: FAMILY MEDICINE CLINIC | Age: 47
End: 2022-10-04

## 2022-10-04 DIAGNOSIS — R06.02 SOB (SHORTNESS OF BREATH) ON EXERTION: ICD-10-CM

## 2022-10-04 DIAGNOSIS — Z86.16 HISTORY OF COVID-19: ICD-10-CM

## 2022-10-04 DIAGNOSIS — Z86.16 HISTORY OF COVID-19: Primary | ICD-10-CM

## 2022-10-04 DIAGNOSIS — R00.2 PALPITATION: ICD-10-CM

## 2022-10-04 DIAGNOSIS — R00.0 TACHYCARDIA: ICD-10-CM

## 2022-10-04 DIAGNOSIS — R53.83 FATIGUE, UNSPECIFIED TYPE: ICD-10-CM

## 2022-10-04 LAB
LV EF: 63 %
LVEF MODALITY: NORMAL

## 2022-10-04 PROCEDURE — 71046 X-RAY EXAM CHEST 2 VIEWS: CPT

## 2022-10-04 PROCEDURE — 93306 TTE W/DOPPLER COMPLETE: CPT

## 2022-10-04 PROCEDURE — 93270 REMOTE 30 DAY ECG REV/REPORT: CPT

## 2022-10-04 RX ORDER — AMOXICILLIN AND CLAVULANATE POTASSIUM 875; 125 MG/1; MG/1
1 TABLET, FILM COATED ORAL 2 TIMES DAILY
Qty: 20 TABLET | Refills: 0 | Status: SHIPPED | OUTPATIENT
Start: 2022-10-04 | End: 2022-10-14

## 2022-10-04 RX ORDER — AMOXICILLIN AND CLAVULANATE POTASSIUM 875; 125 MG/1; MG/1
1 TABLET, FILM COATED ORAL 2 TIMES DAILY
Qty: 20 TABLET | Refills: 0 | Status: SHIPPED | OUTPATIENT
Start: 2022-10-04 | End: 2022-10-04 | Stop reason: SDUPTHER

## 2022-10-04 ASSESSMENT — LIFESTYLE VARIABLES
SMOKING_STATUS: NO, I'M A FORMER SMOKER
SMOKING_YEARS: 10
PACKS_PER_DAY: 1

## 2022-10-04 NOTE — PROCEDURES
The skin was prepped and a 7 day cardiac event monitor was applied. The patient was instructed on the documentation of symptoms and the purpose of the monitor as well as the things to avoid while wearing the monitor. The patient was instructed to remove and return the monitor on 10/11/2022.   The serial number of the monitor that was applied is [id number]

## 2022-10-04 NOTE — TELEPHONE ENCOUNTER
----- Message from JASSON Aviles - CNP sent at 10/4/2022  1:20 PM EDT -----  XR shows possible infection versus inflammation. I want to get her on an antibiotic and repeat CXR about 2 weeks after this is completed. Would like to send in Augmentin 875-125 mg tablets, BID for 10 days.  Thanks, TS

## 2022-10-22 NOTE — PROCEDURES
[address] [place] [address]                                 EVENT MONITOR    PATIENT NAME: Deya Woods                   :        1975  MED REC NO:   [id number]                           ROOM:  ACCOUNT NO:   [de-identified]                           ADMIT DATE: 10/04/2022  PROVIDER:     Vinnie Rosales M.D.    TEST TYPE:  Event monitor. DATE OF ENROLLMENT: 10/04/2022 to 10/10/2022    INDICATIONS:  Tachycardia, short of breath, fatigue. FINDINGS:  The patient is in normal sinus rhythm. Captured heart rate  ranging from  beats per minute. There is no atrial fibrillation. No AV block. No pause. No ventricular or supraventricular tachy or  bradyarrhythmia. No ectopic beat. There are no autotriggered captures. There are over 10 symptom-triggered captures and all those symptoms are  associated with normal sinus rhythm and some with normal heart rate,  some with sinus tachycardia in the rate of 100 to 120s, and all of those  are sinus tachycardia. Otherwise, no other form of arrhythmia noted. CONCLUSION:  This is a normal sinus rhythm. Captured heart rate is  ranging from  beats per minute. The minimum heart rate captured  at least is 69 beats per minute. There was no atrial fibrillation. No AV block. No pause. No  ventricular or supraventricular tachy or bradyarrhythmia noted. There are no ectopic beats. Patient-triggered captures are associated with predominantly sinus  tachycardia in the range of 100 to 130 beats per minute. The complaint  is rapid fast heart rate. At all those times, the patient has a fast  heart rate except one time, rate of 87 beats per minute. Another time,  rate 102 beats per minute, where the patient was reporting fast heart  rate. So overall, the symptom correlates with sinus tachycardia rate of 100 to  130 beats per minute.   Because the minimum heart rate was 71 and the  patient with symptomatology associated with sinus tachycardia, one  should consider inappropriate sinus tachycardia in this patient and  strong clinical correlation is recommended for possible inappropriate  sinus tachycardia. Otherwise, no other form of arrhythmia noted but  inappropriate sinus tachycardia is a diagnosis of exclusion and other  cause of baseline sinus tachycardia needs to be excluded. Manuel Prather M.D.    D: 10/21/2022 17:55:26       T: 10/21/2022 17:57:56     [email or web address]  Job#: [id number]     Doc#: [id number]    CC:

## 2022-11-23 ENCOUNTER — PATIENT MESSAGE (OUTPATIENT)
Dept: FAMILY MEDICINE CLINIC | Age: 47
End: 2022-11-23

## 2022-11-23 NOTE — TELEPHONE ENCOUNTER
From: Natalie Chamberlain  To: Gretchen Biswas  Sent: 11/23/2022 9:14 AM EST  Subject: X-ray and blood tests    I need to get these done but question  1. On blood tests do I need to be off certain vitamins for so long? 2. I tried to get X-ray done before we went to [place] couple weeks ago, but didnt. Then came home with cold/sinus. Did virtual appt with insurance online Dr. Calvin Christie prescribed Doxycycline 100 mg and started that last Saturday. By Sunday/Monday I could tell it was kicking in. Now I am feeling sinus pain pressure again like its reverting back. Should I continue the med for the 7 days? How long should j wait for X-ray?

## 2022-11-25 ENCOUNTER — OFFICE VISIT (OUTPATIENT)
Dept: FAMILY MEDICINE CLINIC | Age: 47
End: 2022-11-25
Payer: COMMERCIAL

## 2022-11-25 VITALS
RESPIRATION RATE: 18 BRPM | DIASTOLIC BLOOD PRESSURE: 70 MMHG | TEMPERATURE: 98.2 F | HEART RATE: 88 BPM | BODY MASS INDEX: 54.59 KG/M2 | WEIGHT: 293 LBS | SYSTOLIC BLOOD PRESSURE: 114 MMHG

## 2022-11-25 DIAGNOSIS — R09.81 SINUS CONGESTION: ICD-10-CM

## 2022-11-25 DIAGNOSIS — R05.9 COUGH, UNSPECIFIED TYPE: ICD-10-CM

## 2022-11-25 DIAGNOSIS — R06.2 WHEEZING: Primary | ICD-10-CM

## 2022-11-25 PROCEDURE — 1036F TOBACCO NON-USER: CPT | Performed by: NURSE PRACTITIONER

## 2022-11-25 PROCEDURE — 99213 OFFICE O/P EST LOW 20 MIN: CPT | Performed by: NURSE PRACTITIONER

## 2022-11-25 PROCEDURE — G8484 FLU IMMUNIZE NO ADMIN: HCPCS | Performed by: NURSE PRACTITIONER

## 2022-11-25 PROCEDURE — G8427 DOCREV CUR MEDS BY ELIG CLIN: HCPCS | Performed by: NURSE PRACTITIONER

## 2022-11-25 PROCEDURE — G8417 CALC BMI ABV UP PARAM F/U: HCPCS | Performed by: NURSE PRACTITIONER

## 2022-11-25 RX ORDER — DOXYCYCLINE HYCLATE 100 MG
TABLET ORAL
COMMUNITY
Start: 2022-11-19

## 2022-11-25 RX ORDER — PREDNISONE 20 MG/1
20 TABLET ORAL 2 TIMES DAILY
Qty: 10 TABLET | Refills: 0 | Status: SHIPPED | OUTPATIENT
Start: 2022-11-25 | End: 2022-11-30

## 2022-11-25 NOTE — PROGRESS NOTES
Chief Complaint   Patient presents with    Cough     C/O cough, congestion, sneezing, sinus pressure x couple weeks. Was seen by telehealth and was given doxycycline. Felt better for a couple days and now symptoms are back. Took at-home COVID test last Thursday and was negative. Erma Lorenzo is a 41 y. o.female      Pt recently took doxycycline (still has 3 pills left) for sinusitis by telehealth. Initially felt better but now back to where she was. C/o cough, sinus pressure and pain, some body aches and chills, run down, fatigued, occasional wheezing. She has been using sinus rinses and otc alkaseltzer. She was on an Augmentin early October. She does have sinus issues and has been on allergy injections but not for a while. Review of Systems   Constitutional:  Positive for activity change, appetite change, chills and fatigue. Negative for diaphoresis and fever. HENT:  Positive for congestion. Respiratory:  Positive for cough and wheezing. Negative for shortness of breath. Cardiovascular:  Negative for chest pain, palpitations and leg swelling. Gastrointestinal:  Negative for blood in stool, constipation, diarrhea, nausea and vomiting. Genitourinary:  Negative for dysuria and hematuria. Musculoskeletal:  Positive for myalgias. Neurological:  Negative for dizziness and headaches. All other systems reviewed and are negative. OBJECTIVE     /70   Pulse 88   Temp 98.2 °F (36.8 °C) (Oral)   Resp 18   Wt (!) 359 lb (162.8 kg)   BMI 54.59 kg/m²     Physical Exam  Vitals and nursing note reviewed. Constitutional:       Appearance: She is well-developed. HENT:      Head: Normocephalic and atraumatic. Right Ear: External ear normal.      Left Ear: External ear normal.      Nose: Nose normal.   Eyes:      Conjunctiva/sclera: Conjunctivae normal.      Pupils: Pupils are equal, round, and reactive to light.    Cardiovascular:      Rate and Rhythm: Normal rate and regular rhythm. Heart sounds: Normal heart sounds. Pulmonary:      Effort: Pulmonary effort is normal.      Breath sounds: Wheezing present. Abdominal:      General: Bowel sounds are normal.      Palpations: Abdomen is soft. Musculoskeletal:         General: Normal range of motion. Cervical back: Normal range of motion and neck supple. Skin:     General: Skin is warm and dry. Neurological:      Mental Status: She is alert and oriented to person, place, and time. Deep Tendon Reflexes: Reflexes are normal and symmetric. Psychiatric:         Behavior: Behavior normal.         Thought Content: Thought content normal.         Judgment: Judgment normal.         No results found for this visit on 11/25/22. ASSESSMENT       Diagnosis Orders   1. Wheezing        2. Cough, unspecified type        3. Sinus congestion            PLAN     Requested Prescriptions     Signed Prescriptions Disp Refills    predniSONE (DELTASONE) 20 MG tablet 10 tablet 0     Sig: Take 1 tablet by mouth 2 times daily for 5 days     Finish doxy  Start prednisone  Discussed going back to allergist versus ENT referral  Pt to send update on mon or tues  Follow up if symptoms fail to improve or worsen    No orders of the defined types were placed in this encounter.               Electronically signed by JASSON Ruggiero CNP on 11/29/2022 at 2:30 PM

## 2022-11-29 ASSESSMENT — ENCOUNTER SYMPTOMS
NAUSEA: 0
WHEEZING: 1
VOMITING: 0
CONSTIPATION: 0
COUGH: 1
SHORTNESS OF BREATH: 0
BLOOD IN STOOL: 0
DIARRHEA: 0

## 2022-11-29 NOTE — TELEPHONE ENCOUNTER
This medication refill is regarding a electronic request. Refill requested by  Sarah Villavicencio . Requested Prescriptions     Pending Prescriptions Disp Refills    montelukast (SINGULAIR) 10 MG tablet [Pharmacy Med Name: MONTELUKAST SOD 10 MG TABLET] 90 tablet 0     Sig: TAKE 1 TABLET BY MOUTH EVERY DAY AT NIGHT    omeprazole (PRILOSEC) 40 MG delayed release capsule [Pharmacy Med Name: OMEPRAZOLE DR 40 MG CAPSULE] 90 capsule 0     Sig: TAKE 1 CAPSULE BY MOUTH EVERY DAY IN THE MORNING BEFORE BREAKFAST    cetirizine (ZYRTEC) 10 MG tablet [Pharmacy Med Name: [facility] ALL[facility] (CETRZN) 10 MG TAB] 90 tablet 0     Sig: TAKE 1 TABLET BY MOUTH EVERY DAY    escitalopram (LEXAPRO) 20 MG tablet [Pharmacy Med Name: ESCITALOPRAM 20 MG TABLET] 90 tablet 0     Sig: TAKE 1 TABLET BY MOUTH EVERY DAY    loratadine (CLARITIN) 10 MG tablet 90 tablet 3     Sig: TAKE 1 TABLET BY MOUTH EVERY DAY    meloxicam (MOBIC) 15 MG tablet 90 tablet 0     Sig: take 1 tablet by mouth once daily       Date of last visit: 11/25/2022   Date of next visit: None  Date of last refill:   Montelukast-9/9/2022 #90/0  Omeprazole-9/9/2022 #90/0  Cetirizine-9/9/2022 #90/0  Ezcitalopram-9/9/2022 #90/0  Loratadine-9/14/2022 #90/3  Meloxicam-8/23/2021 #N/A  Pharmacy Name: [facility]    Rx verified, ordered and set to EP.

## 2022-11-30 RX ORDER — ESCITALOPRAM OXALATE 20 MG/1
TABLET ORAL
Qty: 90 TABLET | Refills: 0 | Status: SHIPPED | OUTPATIENT
Start: 2022-11-30

## 2022-11-30 RX ORDER — CETIRIZINE HYDROCHLORIDE 10 MG/1
TABLET ORAL
Qty: 90 TABLET | Refills: 0 | Status: SHIPPED | OUTPATIENT
Start: 2022-11-30

## 2022-11-30 RX ORDER — OMEPRAZOLE 40 MG/1
CAPSULE, DELAYED RELEASE ORAL
Qty: 90 CAPSULE | Refills: 0 | Status: SHIPPED | OUTPATIENT
Start: 2022-11-30

## 2022-11-30 RX ORDER — LORATADINE 10 MG/1
TABLET ORAL
Qty: 90 TABLET | Refills: 3 | Status: SHIPPED | OUTPATIENT
Start: 2022-11-30

## 2022-11-30 RX ORDER — MONTELUKAST SODIUM 10 MG/1
TABLET ORAL
Qty: 90 TABLET | Refills: 0 | Status: SHIPPED | OUTPATIENT
Start: 2022-11-30

## 2022-11-30 RX ORDER — MELOXICAM 15 MG/1
TABLET ORAL
Qty: 90 TABLET | Refills: 0 | Status: SHIPPED | OUTPATIENT
Start: 2022-11-30

## 2022-12-30 ENCOUNTER — HOSPITAL ENCOUNTER (OUTPATIENT)
Age: 47
Discharge: HOME | End: 2022-12-30
Payer: COMMERCIAL

## 2022-12-30 ENCOUNTER — HOSPITAL ENCOUNTER (OUTPATIENT)
Dept: GENERAL RADIOLOGY | Age: 47
Discharge: HOME | End: 2022-12-30
Payer: COMMERCIAL

## 2022-12-30 DIAGNOSIS — R06.02 SOB (SHORTNESS OF BREATH) ON EXERTION: ICD-10-CM

## 2022-12-30 DIAGNOSIS — Z13.220 SCREENING, LIPID: ICD-10-CM

## 2022-12-30 DIAGNOSIS — Z00.00 WELL ADULT EXAM: ICD-10-CM

## 2022-12-30 DIAGNOSIS — Z86.16 HISTORY OF COVID-19: ICD-10-CM

## 2022-12-30 LAB
ALBUMIN SERPL-MCNC: 4.1 G/DL (ref 3.5–5.1)
ALP BLD-CCNC: 141 U/L (ref 38–126)
ALT SERPL-CCNC: 51 U/L (ref 11–66)
ANION GAP SERPL CALCULATED.3IONS-SCNC: 15 MEQ/L (ref 8–16)
AST SERPL-CCNC: 57 U/L (ref 5–40)
BASOPHILS # BLD: 1 %
BASOPHILS ABSOLUTE: 0.1 THOU/MM3 (ref 0–0.1)
BILIRUB SERPL-MCNC: 0.4 MG/DL (ref 0.3–1.2)
BUN BLDV-MCNC: 14 MG/DL (ref 7–22)
CALCIUM SERPL-MCNC: 9.7 MG/DL (ref 8.5–10.5)
CHLORIDE BLD-SCNC: 98 MEQ/L (ref 98–111)
CHOLESTEROL, TOTAL: 215 MG/DL (ref 100–199)
CO2: 25 MEQ/L (ref 23–33)
CREAT SERPL-MCNC: 0.5 MG/DL (ref 0.4–1.2)
EOSINOPHIL # BLD: 4 %
EOSINOPHILS ABSOLUTE: 0.3 THOU/MM3 (ref 0–0.4)
ERYTHROCYTE [DISTWIDTH] IN BLOOD BY AUTOMATED COUNT: 14.1 % (ref 11.5–14.5)
ERYTHROCYTE [DISTWIDTH] IN BLOOD BY AUTOMATED COUNT: 45.1 FL (ref 35–45)
GFR SERPL CREATININE-BSD FRML MDRD: > 60 ML/MIN/1.73M2
GLUCOSE BLD-MCNC: 180 MG/DL (ref 70–108)
HCT VFR BLD CALC: 44.5 % (ref 37–47)
HDLC SERPL-MCNC: 36 MG/DL
HEMOGLOBIN: 14.8 GM/DL (ref 12–16)
IMMATURE GRANS (ABS): 0.02 THOU/MM3 (ref 0–0.07)
IMMATURE GRANULOCYTES: 0.3 %
LDL CHOLESTEROL CALCULATED: 119 MG/DL
LYMPHOCYTES # BLD: 29 %
LYMPHOCYTES ABSOLUTE: 2.3 THOU/MM3 (ref 1–4.8)
MCH RBC QN AUTO: 29.5 PG (ref 26–33)
MCHC RBC AUTO-ENTMCNC: 33.3 GM/DL (ref 32.2–35.5)
MCV RBC AUTO: 88.6 FL (ref 81–99)
MONOCYTES # BLD: 7.4 %
MONOCYTES ABSOLUTE: 0.6 THOU/MM3 (ref 0.4–1.3)
NUCLEATED RED BLOOD CELLS: 0 /100 WBC
PLATELET # BLD: 257 THOU/MM3 (ref 130–400)
PMV BLD AUTO: 10.7 FL (ref 9.4–12.4)
POTASSIUM SERPL-SCNC: 4.1 MEQ/L (ref 3.5–5.2)
RBC # BLD: 5.02 MILL/MM3 (ref 4.2–5.4)
SEG NEUTROPHILS: 58.3 %
SEGMENTED NEUTROPHILS ABSOLUTE COUNT: 4.6 THOU/MM3 (ref 1.8–7.7)
SODIUM BLD-SCNC: 138 MEQ/L (ref 135–145)
T4 FREE: 1.07 NG/DL (ref 0.93–1.76)
TOTAL PROTEIN: 7.3 G/DL (ref 6.1–8)
TRIGL SERPL-MCNC: 299 MG/DL (ref 0–199)
TSH SERPL DL<=0.05 MIU/L-ACNC: 1.72 UIU/ML (ref 0.4–4.2)
WBC # BLD: 7.9 THOU/MM3 (ref 4.8–10.8)

## 2022-12-30 PROCEDURE — 36415 COLL VENOUS BLD VENIPUNCTURE: CPT

## 2022-12-30 PROCEDURE — 71046 X-RAY EXAM CHEST 2 VIEWS: CPT

## 2022-12-30 PROCEDURE — 80053 COMPREHEN METABOLIC PANEL: CPT

## 2022-12-30 PROCEDURE — 84439 ASSAY OF FREE THYROXINE: CPT

## 2022-12-30 PROCEDURE — 84443 ASSAY THYROID STIM HORMONE: CPT

## 2022-12-30 PROCEDURE — 85025 COMPLETE CBC W/AUTO DIFF WBC: CPT

## 2022-12-30 PROCEDURE — 80061 LIPID PANEL: CPT

## 2023-01-04 RX ORDER — SUMATRIPTAN 100 MG/1
100 TABLET, FILM COATED ORAL
Qty: 9 TABLET | Refills: 5 | Status: SHIPPED | OUTPATIENT
Start: 2023-01-04 | End: 2023-01-04

## 2023-01-06 ENCOUNTER — TELEPHONE (OUTPATIENT)
Dept: FAMILY MEDICINE CLINIC | Age: 48
End: 2023-01-06

## 2023-01-06 DIAGNOSIS — R74.01 ELEVATED AST (SGOT): ICD-10-CM

## 2023-01-06 DIAGNOSIS — R73.01 IFG (IMPAIRED FASTING GLUCOSE): Primary | ICD-10-CM

## 2023-01-06 DIAGNOSIS — R74.8 ALKALINE PHOSPHATASE ELEVATION: ICD-10-CM

## 2023-01-06 DIAGNOSIS — E78.2 ELEVATED TRIGLYCERIDES WITH HIGH CHOLESTEROL: ICD-10-CM

## 2023-01-06 NOTE — TELEPHONE ENCOUNTER
----- Message from JASSON Key CNP sent at 1/3/2023  8:44 AM EST -----  CBC is good  Thyroid is good  FLP shows slightly improve cholesterol of 215, but triglycerides have greatly elevated from 128-299. Has there been any changes recently to diet/activity? Would recommend working on diet and exercise and recheck triglyceride level in 3 months. CMP shows elevated glucose of 180. If fasting, needs an A1C now. CMP also shows elevated AST and alkaline phosphatase. This can be from diet, fatty liver, etc.  Repeat HFP in 3 months as well.   Thanks, TS      The 10-year ASCVD risk score (Jesi DK, et al., 2019) is: 1.4%    Values used to calculate the score:      Age: 41 years      Sex: Female      Is Non- : No      Diabetic: No      Tobacco smoker: No      Systolic Blood Pressure: 856 mmHg      Is BP treated: No      HDL Cholesterol: 36 mg/dL      Total Cholesterol: 215 mg/dL

## 2023-01-06 NOTE — TELEPHONE ENCOUNTER
----- Message from JASSON Pinzon CNP sent at 12/30/2022  2:10 PM EST -----  CXR does not show any acute findings. It does not some chronic widening of right temporal joint. This may cause her some discomfort in the clavicular area.  Thanks, TS

## 2023-01-10 NOTE — TELEPHONE ENCOUNTER
Patient notified. States that her diet has changed some recently and she is aware of that. She was fasting at the time of the draw and is agreeable to completing the A1C now. Order placed in epic--she will have done at a [facility]. Orders for the other labs mailed to patient.

## 2023-01-12 ENCOUNTER — HOSPITAL ENCOUNTER (OUTPATIENT)
Age: 48
Discharge: HOME | End: 2023-01-12
Payer: COMMERCIAL

## 2023-01-12 DIAGNOSIS — R73.01 IFG (IMPAIRED FASTING GLUCOSE): ICD-10-CM

## 2023-01-12 PROCEDURE — 36415 COLL VENOUS BLD VENIPUNCTURE: CPT

## 2023-01-12 PROCEDURE — 83036 HEMOGLOBIN GLYCOSYLATED A1C: CPT

## 2023-01-13 ENCOUNTER — PATIENT MESSAGE (OUTPATIENT)
Dept: FAMILY MEDICINE CLINIC | Age: 48
End: 2023-01-13

## 2023-01-13 DIAGNOSIS — E11.9 TYPE 2 DIABETES MELLITUS WITHOUT COMPLICATION, WITHOUT LONG-TERM CURRENT USE OF INSULIN (HCC): ICD-10-CM

## 2023-01-13 LAB
AVERAGE GLUCOSE: 213 MG/DL (ref 70–126)
HBA1C MFR BLD: 9.1 % (ref 4.4–6.4)

## 2023-01-16 NOTE — TELEPHONE ENCOUNTER
A1C is 9.1. This is diabetic range. I do not believe patient previously had a diagnosis of diabetes. Needs appt to discuss ASAP and get started on medication.  Thanks, TS

## 2023-01-19 ENCOUNTER — OFFICE VISIT (OUTPATIENT)
Dept: FAMILY MEDICINE CLINIC | Age: 48
End: 2023-01-19
Payer: COMMERCIAL

## 2023-01-19 VITALS
DIASTOLIC BLOOD PRESSURE: 94 MMHG | BODY MASS INDEX: 53.37 KG/M2 | RESPIRATION RATE: 16 BRPM | TEMPERATURE: 97.6 F | HEART RATE: 100 BPM | SYSTOLIC BLOOD PRESSURE: 136 MMHG | WEIGHT: 293 LBS

## 2023-01-19 DIAGNOSIS — E66.01 MORBID OBESITY (HCC): ICD-10-CM

## 2023-01-19 DIAGNOSIS — E11.9 TYPE 2 DIABETES MELLITUS WITHOUT COMPLICATION, WITHOUT LONG-TERM CURRENT USE OF INSULIN (HCC): Primary | ICD-10-CM

## 2023-01-19 PROCEDURE — 4004F PT TOBACCO SCREEN RCVD TLK: CPT | Performed by: NURSE PRACTITIONER

## 2023-01-19 PROCEDURE — G8427 DOCREV CUR MEDS BY ELIG CLIN: HCPCS | Performed by: NURSE PRACTITIONER

## 2023-01-19 PROCEDURE — G8484 FLU IMMUNIZE NO ADMIN: HCPCS | Performed by: NURSE PRACTITIONER

## 2023-01-19 PROCEDURE — 2022F DILAT RTA XM EVC RTNOPTHY: CPT | Performed by: NURSE PRACTITIONER

## 2023-01-19 PROCEDURE — G8417 CALC BMI ABV UP PARAM F/U: HCPCS | Performed by: NURSE PRACTITIONER

## 2023-01-19 PROCEDURE — 3046F HEMOGLOBIN A1C LEVEL >9.0%: CPT | Performed by: NURSE PRACTITIONER

## 2023-01-19 PROCEDURE — 99213 OFFICE O/P EST LOW 20 MIN: CPT | Performed by: NURSE PRACTITIONER

## 2023-01-19 RX ORDER — BETAMETHASONE DIPROPIONATE 0.5 MG/G
LOTION TOPICAL
COMMUNITY
Start: 2022-11-20

## 2023-01-19 RX ORDER — KETOCONAZOLE 20 MG/ML
SHAMPOO TOPICAL
COMMUNITY
Start: 2022-12-23

## 2023-01-19 RX ORDER — METFORMIN HYDROCHLORIDE 500 MG/1
1000 TABLET, EXTENDED RELEASE ORAL
Qty: 60 TABLET | Refills: 2 | Status: SHIPPED | OUTPATIENT
Start: 2023-01-19

## 2023-01-19 RX ORDER — LANCETS 30 GAUGE
1 EACH MISCELLANEOUS 2 TIMES DAILY
Qty: 200 EACH | Refills: 3 | Status: SHIPPED | OUTPATIENT
Start: 2023-01-19

## 2023-01-19 RX ORDER — GLUCOSAMINE HCL/CHONDROITIN SU 500-400 MG
CAPSULE ORAL
Qty: 200 STRIP | Refills: 3 | Status: SHIPPED | OUTPATIENT
Start: 2023-01-19

## 2023-01-19 RX ORDER — LANCETS 30 GAUGE
1 EACH MISCELLANEOUS 2 TIMES DAILY
Qty: 100 EACH | Refills: 5 | Status: SHIPPED | OUTPATIENT
Start: 2023-01-19 | End: 2023-01-19 | Stop reason: SDUPTHER

## 2023-01-19 RX ORDER — BLOOD-GLUCOSE METER
1 KIT MISCELLANEOUS DAILY
Qty: 1 KIT | Refills: 0 | Status: SHIPPED | OUTPATIENT
Start: 2023-01-19

## 2023-01-19 RX ORDER — GLUCOSAMINE HCL/CHONDROITIN SU 500-400 MG
CAPSULE ORAL
Qty: 100 STRIP | Refills: 5 | Status: SHIPPED | OUTPATIENT
Start: 2023-01-19 | End: 2023-01-19 | Stop reason: SDUPTHER

## 2023-01-19 ASSESSMENT — PATIENT HEALTH QUESTIONNAIRE - PHQ9
SUM OF ALL RESPONSES TO PHQ QUESTIONS 1-9: 0
1. LITTLE INTEREST OR PLEASURE IN DOING THINGS: 0
2. FEELING DOWN, DEPRESSED OR HOPELESS: 0
SUM OF ALL RESPONSES TO PHQ9 QUESTIONS 1 & 2: 0

## 2023-01-19 NOTE — PROGRESS NOTES
Chief Complaint   Patient presents with    Diabetes Mellitus     New onset DM. A1C result in Epic. Martine Cisneros is a 41 y. o.female      Pt presents for diabetes education for newly diagnosed type 2 diabetes. Review of Systems   Constitutional:  Negative for chills, diaphoresis and fever. Respiratory:  Negative for shortness of breath. Cardiovascular:  Negative for chest pain, palpitations and leg swelling. Gastrointestinal:  Negative for blood in stool, constipation, diarrhea, nausea and vomiting. Genitourinary:  Negative for dysuria and hematuria. Musculoskeletal:  Negative for myalgias. Neurological:  Negative for dizziness and headaches. All other systems reviewed and are negative. OBJECTIVE     BP (!) 136/94   Pulse 100   Temp 97.6 °F (36.4 °C) (Oral)   Resp 16   Wt (!) 351 lb (159.2 kg)   BMI 53.37 kg/m²   Wt Readings from Last 3 Encounters:   01/19/23 (!) 351 lb (159.2 kg)   11/25/22 (!) 359 lb (162.8 kg)   09/23/22 (!) 355 lb (161 kg)       Physical Exam  Vitals and nursing note reviewed. Constitutional:       Appearance: She is well-developed. She is obese. HENT:      Head: Normocephalic and atraumatic. Right Ear: External ear normal.      Left Ear: External ear normal.      Nose: Nose normal.   Eyes:      Conjunctiva/sclera: Conjunctivae normal.      Pupils: Pupils are equal, round, and reactive to light. Cardiovascular:      Rate and Rhythm: Normal rate and regular rhythm. Heart sounds: Normal heart sounds. Pulmonary:      Effort: Pulmonary effort is normal.      Breath sounds: Normal breath sounds. Abdominal:      General: Bowel sounds are normal.      Palpations: Abdomen is soft. Musculoskeletal:         General: Normal range of motion. Cervical back: Normal range of motion and neck supple. Skin:     General: Skin is warm and dry. Neurological:      Mental Status: She is alert and oriented to person, place, and time. Deep Tendon Reflexes: Reflexes are normal and symmetric. Psychiatric:         Behavior: Behavior normal.         Thought Content:  Thought content normal.         Judgment: Judgment normal.       Component      Latest Ref Rng & Units 1/12/2023 12/30/2022   WBC      4.8 - 10.8 thou/mm3  7.9   RBC      4.20 - 5.40 mill/mm3  5.02   Hemoglobin Quant      12.0 - 16.0 gm/dl  14.8   Hematocrit      37.0 - 47.0 %  44.5   MCV      81.0 - 99.0 fL  88.6   MCH      26.0 - 33.0 pg  29.5   MCHC      32.2 - 35.5 gm/dl  33.3   RDW-CV      11.5 - 14.5 %  14.1   RDW-SD      35.0 - 45.0 fL  45.1 (H)   Platelet Count      708 - 400 thou/mm3  257   MPV      9.4 - 12.4 fL  10.7   Seg Neutrophils      %  58.3   Lymphocytes      %  29.0   Monocytes      %  7.4   Eosinophils      %  4.0   Basophils      %  1.0   Immature Granulocytes      %  0.3   Segs Absolute      1.8 - 7.7 thou/mm3  4.6   Lymphocytes Absolute      1.0 - 4.8 thou/mm3  2.3   Monocytes Absolute      0.4 - 1.3 thou/mm3  0.6   Eosinophils Absolute      0.0 - 0.4 thou/mm3  0.3   Basophils Absolute      0.0 - 0.1 thou/mm3  0.1   Immature Grans (Abs)      0.00 - 0.07 thou/mm3  0.02   Nucleated Red Blood Cells      /100 wbc  0   Glucose, Random      70 - 108 mg/dL  180 (H)   Creatinine      0.4 - 1.2 mg/dL  0.5   BUN,BUNPL      7 - 22 mg/dL  14   Sodium      135 - 145 meq/L  138   Potassium      3.5 - 5.2 meq/L  4.1   Chloride      98 - 111 meq/L  98   CO2      23 - 33 meq/L  25   CALCIUM, SERUM, 685736      8.5 - 10.5 mg/dL  9.7   AST      5 - 40 U/L  57 (H)   Alk Phos      38 - 126 U/L  141 (H)   Total Protein      6.1 - 8.0 g/dL  7.3   Albumin      3.5 - 5.1 g/dL  4.1   Bilirubin      0.3 - 1.2 mg/dL  0.4   ALT      11 - 66 U/L  51   CHOLESTEROL, TOTAL, [id number]      100 - 199 mg/dL  215 (H)   Triglycerides      0 - 199 mg/dL  299 (H)   HDL Cholesterol      mg/dL  36   LDL Calculated      mg/dL  119   Hemoglobin A1C      4.4 - 6.4 % 9.1 (H)    AVERAGE GLUCOSE      70 - 126 mg/dL 213 (H)    TSH      0.400 - 4.200 uIU/mL  1.720   T4 Free      0.93 - 1.76 ng/dL  1.07   Anion Gap      8.0 - 16.0 meq/L  15.0   Est, Glom Filt Rate      >60 ml/min/1.73m2  >60     No results found for this visit on 23. ASSESSMENT       Diagnosis Orders   1. Type 2 diabetes mellitus without complication, without long-term current use of insulin ([facility])  Hemoglobin A1C    metFORMIN (GLUCOPHAGE-XR) 500 MG extended release tablet    glucose monitoring (FREESTYLE FREEDOM) kit    [facility]. [facility] - Dietitian    DISCONTINUED: Lancets MISC    DISCONTINUED: blood glucose monitor strips      2.  Morbid obesity ([address]            PLAN     Requested Prescriptions     Signed Prescriptions Disp Refills    metFORMIN (GLUCOPHAGE-XR) 500 MG extended release tablet 60 tablet 2     Sig: Take 2 tablets by mouth Daily with supper    glucose monitoring (FREESTYLE FREEDOM) kit 1 kit 0     Si kit by Does not apply route daily Any meter covered by insurance       Orders Placed This Encounter   Procedures    Hemoglobin A1C     Standing Status:   Future     Standing Expiration Date:   2024    [facility]. [facility] - Dietitian     Referral Priority:   Routine     Referral Type:   Eval and Treat     Referral Reason:   Specialty Services Required     Requested Specialty:   Dietitian Registered     Number of Visits Requested:   1       Referral to dietician  Will start metformin  Glucometer and supplies sent to pharmacy  Repeat A1C in 3 months  Follow up in 3 months          Electronically signed by JASSON Ruiz CNP on 2023 at 8:48 PM

## 2023-01-19 NOTE — TELEPHONE ENCOUNTER
TS--insurance requires patient to use mail order for testing strips and lancets. Pharmacy info entered.

## 2023-01-22 ASSESSMENT — ENCOUNTER SYMPTOMS
VOMITING: 0
DIARRHEA: 0
CONSTIPATION: 0
SHORTNESS OF BREATH: 0
BLOOD IN STOOL: 0
NAUSEA: 0

## 2023-02-08 DIAGNOSIS — E11.9 TYPE 2 DIABETES MELLITUS WITHOUT COMPLICATION, WITHOUT LONG-TERM CURRENT USE OF INSULIN (HCC): ICD-10-CM

## 2023-02-08 RX ORDER — GLUCOSAMINE HCL/CHONDROITIN SU 500-400 MG
CAPSULE ORAL
Qty: 200 STRIP | Refills: 3 | Status: SHIPPED | OUTPATIENT
Start: 2023-02-08

## 2023-02-08 NOTE — TELEPHONE ENCOUNTER
Incoming fax from [facility] stating that need clarification on frequency of testing. Pt has a history of QID, so they need the prescription to state how frequency (QD, BID, etc) along with the max daily amount the patient will be using. Please advise and review. Strips pended.

## 2023-02-20 RX ORDER — CETIRIZINE HYDROCHLORIDE 10 MG/1
TABLET ORAL
Qty: 90 TABLET | Refills: 3 | Status: SHIPPED | OUTPATIENT
Start: 2023-02-20

## 2023-02-20 NOTE — TELEPHONE ENCOUNTER
Request sent from [facility] pharmacy for refill of Zyrtec 10 mg qd. Last seen 1/19/23, next appt 4/21/23. Med verified. Order pended.

## 2023-02-24 RX ORDER — OMEPRAZOLE 40 MG/1
CAPSULE, DELAYED RELEASE ORAL
Qty: 90 CAPSULE | Refills: 3 | Status: SHIPPED | OUTPATIENT
Start: 2023-02-24

## 2023-02-24 RX ORDER — MONTELUKAST SODIUM 10 MG/1
TABLET ORAL
Qty: 90 TABLET | Refills: 3 | Status: SHIPPED | OUTPATIENT
Start: 2023-02-24

## 2023-02-24 RX ORDER — ESCITALOPRAM OXALATE 20 MG/1
TABLET ORAL
Qty: 90 TABLET | Refills: 3 | Status: SHIPPED | OUTPATIENT
Start: 2023-02-24

## 2023-02-24 RX ORDER — MELOXICAM 15 MG/1
TABLET ORAL
Qty: 90 TABLET | Refills: 3 | Status: SHIPPED | OUTPATIENT
Start: 2023-02-24

## 2023-02-24 NOTE — TELEPHONE ENCOUNTER
This medication refill is regarding a electronic request. Refill requested by  [facility] [place] .    Requested Prescriptions     Pending Prescriptions Disp Refills    omeprazole (PRILOSEC) 40 MG delayed release capsule [Pharmacy Med Name: OMEPRAZOLE DR 40 MG CAPSULE] 90 capsule 3     Sig: TAKE 1 CAPSULE BY MOUTH EVERY DAY IN THE MORNING BEFORE BREAKFAST    escitalopram (LEXAPRO) 20 MG tablet [Pharmacy Med Name: ESCITALOPRAM 20 MG TABLET] 90 tablet 3     Sig: TAKE 1 TABLET BY MOUTH EVERY DAY    montelukast (SINGULAIR) 10 MG tablet [Pharmacy Med Name: MONTELUKAST SOD 10 MG TABLET] 90 tablet 3     Sig: TAKE 1 TABLET BY MOUTH EVERY DAY AT NIGHT    meloxicam (MOBIC) 15 MG tablet [Pharmacy Med Name: MELOXICAM 15 MG TABLET] 90 tablet 3     Sig: TAKE 1 TABLET BY MOUTH EVERY DAY     Date of last visit: 1/19/2023   Date of next visit: 4/21/2023  Date of last refill: 11/30/22 #90/0 for all 4 of them    Rx verified, ordered and set to EP.

## 2023-03-01 ENCOUNTER — OFFICE VISIT (OUTPATIENT)
Dept: INTERNAL MEDICINE CLINIC | Age: 48
End: 2023-03-01
Payer: COMMERCIAL

## 2023-03-01 VITALS — BODY MASS INDEX: 44.41 KG/M2 | HEIGHT: 68 IN | WEIGHT: 293 LBS

## 2023-03-01 DIAGNOSIS — E11.9 TYPE 2 DIABETES MELLITUS WITHOUT COMPLICATION, WITHOUT LONG-TERM CURRENT USE OF INSULIN (HCC): Primary | ICD-10-CM

## 2023-03-01 PROCEDURE — 97802 MEDICAL NUTRITION INDIV IN: CPT | Performed by: DIETITIAN, REGISTERED

## 2023-03-01 NOTE — PROGRESS NOTES
[address] [place] [address]. [address] [place] [address] [facility]  [phone] (phone)  [phone] (fax)    Patient Name: Nichol Solitario. Date of Birth: [id number]. MRN: [id number]      Assessment: Patient is a 41 y.o. female seen for Initial MNT visit for Type 2 DB.     -Nutritionally relevant labs:   Lab Results   Component Value Date/Time    LABA1C 9.1 (H) 01/12/2023 05:41 PM    GLUCOSE 180 (H) 12/30/2022 01:48 PM    GLUCOSE 97 12/20/2021 02:00 PM    GLUCOSE 86 03/03/2017 08:32 AM    GLUCOSE 83 04/27/2016 03:48 PM    CHOL 215 (H) 12/30/2022 01:48 PM    HDL 36 12/30/2022 01:48 PM    LDLCALC 119 12/30/2022 01:48 PM    TRIG 299 (H) 12/30/2022 01:48 PM     -Blood sugar trends: One Touch meter report with BS's and food logging emailed to this RD and printed and plan to scan. 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 Was in [facility] last week. Occ trips with jobs. When she is out of town she has 1 case water, string cheese, cottage cheese, diet dr blank delivered to her. HH - lives alone and son at [facility] recall:   Breakfast: 9 am -skips OR Cottage cheese 1 cup 4%. Diet Dr Jewel Davidson. Use to drink a lot of regular Dr Jewel Davidson. OR   Lunch: leftovers OR demo of kitchen done in Dec.  No sink or stove Jan - July. OR cold meat sandwich - bologna and cheese a lot last year and now doing turkey breast with owusu on aunt millies zero carb bread. 1 cup cottage cheese. OR cheese, leftover pizza and hothead bowl  Dinner: last night - rice or quinoa,  beans, pork, cheese, 90 sec meals. OR procuta bread and cheesem broccoli rice frittersm pasta no tomaotes, chocolate. Snacks: string cheese, cottage cheese. No onions, bell peppers. Eating out/take out - Sherri - chicken nuggets when off the plane. OR chinese - Néstor pi's shrimp fried rice Or kewpee. -Main Beverages: water. Weight loss attempts and weight hx:  Exercise.   Thin and healthy 3x  Weight jada Al Pj. 375# highest - last summer. Lowest 260#  @ home weight today 341#    -Impression of Dietary Intake: on average, 2-3 meals per day, high fat/ cholesterol, lacking routine intake of fruits and vegetables    Current Outpatient Medications on File Prior to Visit   Medication Sig Dispense Refill    omeprazole (PRILOSEC) 40 MG delayed release capsule TAKE 1 CAPSULE BY MOUTH EVERY DAY IN THE MORNING BEFORE BREAKFAST 90 capsule 3    escitalopram (LEXAPRO) 20 MG tablet TAKE 1 TABLET BY MOUTH EVERY DAY 90 tablet 3    montelukast (SINGULAIR) 10 MG tablet TAKE 1 TABLET BY MOUTH EVERY DAY AT NIGHT 90 tablet 3    meloxicam (MOBIC) 15 MG tablet TAKE 1 TABLET BY MOUTH EVERY DAY 90 tablet 3    cetirizine (ZYRTEC) 10 MG tablet TAKE 1 TABLET BY MOUTH EVERY DAY 90 tablet 3    metFORMIN (GLUCOPHAGE-XR) 500 MG extended release tablet TAKE 2 TABLETS BY MOUTH DAILY WITH SUPPER 60 tablet 5    blood glucose monitor strips Test 2 times a day & as needed for symptoms of irregular blood glucose. Dispense sufficient amount for indicated testing frequency plus additional to accommodate PRN testing needs.  Max daily: 4 200 strip 3    betamethasone dipropionate 0.05 % lotion APPLY TO SCALP AND EARS DAILY AT BEDTIME AS NEEDED      ketoconazole (NIZORAL) 2 % shampoo       glucose monitoring (FREESTYLE FREEDOM) kit 1 kit by Does not apply route daily Any meter covered by insurance 1 kit 0    Lancets MISC 1 each by Does not apply route 2 times daily 200 each 3    loratadine (CLARITIN) 10 MG tablet TAKE 1 TABLET BY MOUTH EVERY DAY 90 tablet 3    PREMARIN 0.9 MG tablet take 1 tablet by mouth once daily      Ketoconazole 2 % GEL Apply topically to affected area twice daily 45 g 0    polycarbophil (FIBERCON) 625 MG tablet Take tablets twice a day 360 tablet 3    Cholecalciferol (VITAMIN D3 PO) Take by mouth daily      betamethasone valerate (VALISONE) 0.1 % cream APPLY TO AFFECTED AREA TWICE A DAY 45 g 1    minocycline (MINOCIN;DYNACIN) 100 MG capsule Take 100 mg by mouth daily      spironolactone (ALDACTONE) 100 MG tablet Take 100 mg by mouth daily.        Multiple Vitamin (MULTIVITAMIN PO) Take  by mouth daily.        B Complex Vitamins (VITAMIN B COMPLEX PO) Take by mouth daily      SUMAtriptan (IMITREX) 100 MG tablet TAKE 1 TABLET BY MOUTH ONCE AS NEEDED FOR MIGRAINE 9 tablet 5     No current facility-administered medications on file prior to visit.        Vitals from current and previous visits:  Ht 5' 8\" (1.727 m)   Wt (!) 352 lb (159.7 kg)   BMI 53.52 kg/m²     -Body mass index is 53.52 kg/m². Greater than 40 - Morbid Obesity / Extreme Obesity / Grade III.   -Weight goal: lose weight.     Nutrition Diagnosis:   Obesity related to Lack of previous MNT/currently undergoing MNT as evidenced by Body mass index is 53.52 kg/m²..         Intervention:  -Impression: She will do myfitnesspal for tracking.    -Instructed the patient on: Carbohydrate Counting, Consistent Carbohydrate Intake, Food Label Reading, Meal Planning for Regular, Balanced Meals & Snacks, and The Importance of Regular Physical Activitydoing a meal based food logging.    -Handouts given for: carbohydrate counting, food logging, healthy snacks, sample meal plans/menus, and plate pictures.    Patient Instructions   1.)  Get familiar with reading the nutrition facts label by looking at serving size and total carbohydrates.  - Without a label refer to your carb count guide booklet.    2.)  Measure foods for accuracy in carb counting.    3.)  Healthy carb choices:  whole grains, whole wheat pasta, starchy vegetables, fresh fruit and lowfat/non-fat milk and yogurt.    4.)  Your meal plan is found on the inside cover of your carb counting guide booklet:  1st meal or Brkf:  45 gms carbohydrates + 1-2 oz protein  2nd meal or Lunch: 60 gms carbohydrates + 2-3 oz protein + non-starchy veggies  3rd meal or Dinner:  60 gms carbohydrates + 2-3 oz protein + non- starchy  veggies    Snack time:  15 - 20 gms carbohydrates + 1 oz protein    5.)  Choose lean protein most of the time and Cut in 1/2 added fats to help with weight loss efforts. 6.) Bring a 1-2 week food log and meter to next dietitian appt. Thanks. Check BS:  2x/day at various times of the day. Fasting BS (1st thing in the morning) or before a meal (at least 4 hour since last ate), BS goal:  90 - 130  2 hours after the start of a meal, BS goal:  100 - 150     7.)  Get active each day. -Nutrition prescription: 1600 - 1800 calories/day, 180g carbs/day. Adj wt. 193# (87 kg)    Comprehension verified using teachback method. Monitoring/Evaluation:   -Followup visit: 6 weeks with dietitian.   -Receptiveness to education/goals: Agreeable.  -Evaluation of education: Indicates understanding.  -Readiness to change: precontemplative- measuring and carb counting as a 1st step. -Expected compliance: good. Thank you for your referral of this patient. Total time involved in direct patient education: 45 minutes for initial MNT visit.

## 2023-03-01 NOTE — PATIENT INSTRUCTIONS
1. )  Get familiar with reading the nutrition facts label by looking at serving size and total carbohydrates. - Without a label refer to your carb count guide booklet. 2.)  Measure foods for accuracy in carb counting.    3.)  Healthy carb choices:  whole grains, whole wheat pasta, starchy vegetables, fresh fruit and lowfat/non-fat milk and yogurt. 4.)  Your meal plan is found on the inside cover of your carb counting guide booklet:  1st meal or Brkf:  45 gms carbohydrates + 1-2 oz protein  2nd meal or Lunch: 60 gms carbohydrates + 2-3 oz protein + non-starchy veggies  3rd meal or Dinner:  60 gms carbohydrates + 2-3 oz protein + non- starchy veggies    Snack time:  15 - 20 gms carbohydrates + 1 oz protein    5.)  Choose lean protein most of the time and Cut in 1/2 added fats to help with weight loss efforts. 6.) Bring a 1-2 week food log and meter to next dietitian appt. Thanks. Check BS:  2x/day at various times of the day. Fasting BS (1st thing in the morning) or before a meal (at least 4 hour since last ate), BS goal:  90 - 130  2 hours after the start of a meal, BS goal:  100 - 150     7.)  Get active each day.

## 2023-03-13 PROBLEM — E11.9 TYPE 2 DIABETES MELLITUS WITHOUT COMPLICATION, WITHOUT LONG-TERM CURRENT USE OF INSULIN (HCC): Status: ACTIVE | Noted: 2023-03-13

## 2023-03-23 DIAGNOSIS — E11.9 TYPE 2 DIABETES MELLITUS WITHOUT COMPLICATION, WITHOUT LONG-TERM CURRENT USE OF INSULIN (HCC): ICD-10-CM

## 2023-03-23 RX ORDER — MELOXICAM 15 MG/1
TABLET ORAL
Qty: 90 TABLET | Refills: 3 | Status: SHIPPED | OUTPATIENT
Start: 2023-03-23

## 2023-03-23 RX ORDER — ESCITALOPRAM OXALATE 20 MG/1
20 TABLET ORAL DAILY
Qty: 90 TABLET | Refills: 3 | Status: SHIPPED | OUTPATIENT
Start: 2023-03-23

## 2023-03-23 RX ORDER — SUMATRIPTAN 100 MG/1
100 TABLET, FILM COATED ORAL
Qty: 9 TABLET | Refills: 5 | Status: SHIPPED | OUTPATIENT
Start: 2023-03-23 | End: 2023-03-23

## 2023-03-23 RX ORDER — METFORMIN HYDROCHLORIDE 500 MG/1
1000 TABLET, EXTENDED RELEASE ORAL
Qty: 180 TABLET | Refills: 1 | Status: SHIPPED | OUTPATIENT
Start: 2023-03-23

## 2023-03-23 RX ORDER — OMEPRAZOLE 40 MG/1
CAPSULE, DELAYED RELEASE ORAL
Qty: 90 CAPSULE | Refills: 3 | Status: SHIPPED | OUTPATIENT
Start: 2023-03-23

## 2023-03-23 RX ORDER — MONTELUKAST SODIUM 10 MG/1
10 TABLET ORAL NIGHTLY
Qty: 90 TABLET | Refills: 3 | Status: SHIPPED | OUTPATIENT
Start: 2023-03-23

## 2023-03-23 RX ORDER — GLUCOSAMINE HCL/CHONDROITIN SU 500-400 MG
CAPSULE ORAL
Qty: 200 STRIP | Refills: 3 | Status: SHIPPED | OUTPATIENT
Start: 2023-03-23

## 2023-03-23 NOTE — TELEPHONE ENCOUNTER
This medication refill is regarding a fax request. Refill requested by  [facility] . Requested Prescriptions     Pending Prescriptions Disp Refills    metFORMIN (GLUCOPHAGE-XR) 500 MG extended release tablet 180 tablet 1     Sig: Take 2 tablets by mouth Daily with supper    montelukast (SINGULAIR) 10 MG tablet 90 tablet 3     Sig: Take 1 tablet by mouth nightly    omeprazole (PRILOSEC) 40 MG delayed release capsule 90 capsule 3     Sig: TAKE 1 CAPSULE BY MOUTH EVERY DAY IN THE MORNING BEFORE BREAKFAST    SUMAtriptan (IMITREX) 100 MG tablet 9 tablet 5     Sig: Take 1 tablet by mouth once as needed for Migraine    escitalopram (LEXAPRO) 20 MG tablet 90 tablet 3     Sig: Take 1 tablet by mouth daily    meloxicam (MOBIC) 15 MG tablet 90 tablet 3     Sig: TAKE 1 TABLET BY MOUTH EVERY DAY    blood glucose monitor strips 200 strip 3     Sig: Test 2 times a day & as needed for symptoms of irregular blood glucose. Dispense sufficient amount for indicated testing frequency plus additional to accommodate PRN testing needs. Max daily: 4     Date of last visit: 1/19/2023   Date of next visit: 4/21/2023  Date of last refill:    -Omeprazole 2/24/23 #90/3   -Escitalopram 2/24/23 #90/3   -Montelukast 2/24/23 #90/3   -Meloxicam 2/24/23 #90/3   -Metformin 2/13/23 #60/5   -Test strips 2/8/23 #200/3   -Sumatriptan 1/4/23 #9/5    Last Hemoglobin A1C:    Lab Results   Component Value Date/Time    LABA1C 9.1 01/12/2023 05:41 PM    [address] [place] [address] [address] 01/12/2023 05:41 PM     Rx verified, ordered and set to EP.

## 2023-04-18 ENCOUNTER — HOSPITAL ENCOUNTER (OUTPATIENT)
Age: 48
Discharge: HOME | End: 2023-04-18
Payer: COMMERCIAL

## 2023-04-18 DIAGNOSIS — R74.01 ELEVATED AST (SGOT): ICD-10-CM

## 2023-04-18 DIAGNOSIS — E78.2 ELEVATED TRIGLYCERIDES WITH HIGH CHOLESTEROL: ICD-10-CM

## 2023-04-18 DIAGNOSIS — R74.8 ALKALINE PHOSPHATASE ELEVATION: ICD-10-CM

## 2023-04-18 DIAGNOSIS — E11.9 TYPE 2 DIABETES MELLITUS WITHOUT COMPLICATION, WITHOUT LONG-TERM CURRENT USE OF INSULIN (HCC): ICD-10-CM

## 2023-04-18 LAB
ALBUMIN SERPL BCG-MCNC: 4.6 G/DL (ref 3.5–5.1)
ALP SERPL-CCNC: 105 U/L (ref 38–126)
ALT SERPL W/O P-5'-P-CCNC: 32 U/L (ref 11–66)
AST SERPL-CCNC: 26 U/L (ref 5–40)
BILIRUB CONJ SERPL-MCNC: < 0.2 MG/DL (ref 0–0.3)
BILIRUB SERPL-MCNC: 0.5 MG/DL (ref 0.3–1.2)
DEPRECATED MEAN GLUCOSE BLD GHB EST-ACNC: 147 MG/DL (ref 70–126)
HBA1C MFR BLD HPLC: 6.9 % (ref 4.4–6.4)
PROT SERPL-MCNC: 6.9 G/DL (ref 6.1–8)
TRIGL SERPL-MCNC: 187 MG/DL (ref 0–199)

## 2023-04-18 PROCEDURE — 36415 COLL VENOUS BLD VENIPUNCTURE: CPT

## 2023-04-18 PROCEDURE — 83036 HEMOGLOBIN GLYCOSYLATED A1C: CPT

## 2023-04-18 PROCEDURE — 80076 HEPATIC FUNCTION PANEL: CPT

## 2023-04-18 PROCEDURE — 84478 ASSAY OF TRIGLYCERIDES: CPT

## 2023-04-21 ENCOUNTER — OFFICE VISIT (OUTPATIENT)
Dept: FAMILY MEDICINE CLINIC | Age: 48
End: 2023-04-21
Payer: COMMERCIAL

## 2023-04-21 VITALS
BODY MASS INDEX: 51.03 KG/M2 | RESPIRATION RATE: 16 BRPM | WEIGHT: 293 LBS | HEART RATE: 88 BPM | SYSTOLIC BLOOD PRESSURE: 130 MMHG | DIASTOLIC BLOOD PRESSURE: 80 MMHG

## 2023-04-21 DIAGNOSIS — E66.01 MORBID OBESITY (HCC): ICD-10-CM

## 2023-04-21 DIAGNOSIS — E11.9 TYPE 2 DIABETES MELLITUS WITHOUT COMPLICATION, WITHOUT LONG-TERM CURRENT USE OF INSULIN (HCC): Primary | ICD-10-CM

## 2023-04-21 PROCEDURE — 4004F PT TOBACCO SCREEN RCVD TLK: CPT | Performed by: NURSE PRACTITIONER

## 2023-04-21 PROCEDURE — 2022F DILAT RTA XM EVC RTNOPTHY: CPT | Performed by: NURSE PRACTITIONER

## 2023-04-21 PROCEDURE — G8427 DOCREV CUR MEDS BY ELIG CLIN: HCPCS | Performed by: NURSE PRACTITIONER

## 2023-04-21 PROCEDURE — 3044F HG A1C LEVEL LT 7.0%: CPT | Performed by: NURSE PRACTITIONER

## 2023-04-21 PROCEDURE — G8417 CALC BMI ABV UP PARAM F/U: HCPCS | Performed by: NURSE PRACTITIONER

## 2023-04-21 PROCEDURE — 99214 OFFICE O/P EST MOD 30 MIN: CPT | Performed by: NURSE PRACTITIONER

## 2023-04-21 RX ORDER — ESTRADIOL 1 MG/1
TABLET ORAL
COMMUNITY
Start: 2023-03-30

## 2023-04-21 SDOH — ECONOMIC STABILITY: INCOME INSECURITY: HOW HARD IS IT FOR YOU TO PAY FOR THE VERY BASICS LIKE FOOD, HOUSING, MEDICAL CARE, AND HEATING?: NOT HARD AT ALL

## 2023-04-21 SDOH — ECONOMIC STABILITY: FOOD INSECURITY: WITHIN THE PAST 12 MONTHS, YOU WORRIED THAT YOUR FOOD WOULD RUN OUT BEFORE YOU GOT MONEY TO BUY MORE.: NEVER TRUE

## 2023-04-21 SDOH — ECONOMIC STABILITY: HOUSING INSECURITY
IN THE LAST 12 MONTHS, WAS THERE A TIME WHEN YOU DID NOT HAVE A STEADY PLACE TO SLEEP OR SLEPT IN A SHELTER (INCLUDING NOW)?: NO

## 2023-04-21 SDOH — ECONOMIC STABILITY: FOOD INSECURITY: WITHIN THE PAST 12 MONTHS, THE FOOD YOU BOUGHT JUST DIDN'T LAST AND YOU DIDN'T HAVE MONEY TO GET MORE.: NEVER TRUE

## 2023-04-21 ASSESSMENT — ENCOUNTER SYMPTOMS
SHORTNESS OF BREATH: 0
VOMITING: 0
BLOOD IN STOOL: 0
CONSTIPATION: 0
NAUSEA: 0
DIARRHEA: 0

## 2023-04-21 NOTE — PROGRESS NOTES
General: Normal range of motion. Cervical back: Normal range of motion and neck supple. Skin:     General: Skin is warm and dry. Neurological:      Mental Status: She is alert and oriented to person, place, and time. Deep Tendon Reflexes: Reflexes are normal and symmetric. Psychiatric:         Behavior: Behavior normal.         Thought Content: Thought content normal.         Judgment: Judgment normal.     Component      Latest Ref Rng & Units 4/18/2023   Albumin      3.5 - 5.1 g/dL 4.6   BILIRUBIN TOTAL      0.3 - 1.2 mg/dL 0.5   Bilirubin, Direct      0.0 - 0.3 mg/dL <0.2   Alk Phos      38 - 126 U/L 105   AST      5 - 40 U/L 26   ALT      11 - 66 U/L 32   Total Protein      6.1 - 8.0 g/dL 6.9   Hemoglobin A1C      4.4 - 6.4 % 6.9 (H)   AVERAGE GLUCOSE      70 - 126 mg/dL 147 (H)   Triglycerides      0 - 199 mg/dL 187       No results found for this visit on 04/21/23. ASSESSMENT       Diagnosis Orders   1. Type 2 diabetes mellitus without complication, without long-term current use of insulin ([facility])  Microalbumin / Creatinine Urine Ratio    Hemoglobin A1C      2. Morbid obesity ([facility] [address]            PLAN         Orders Placed This Encounter   Procedures    Microalbumin / Creatinine Urine Ratio     Standing Status:   Future     Standing Expiration Date:   4/21/2024    Hemoglobin A1C     Standing Status:   Future     Standing Expiration Date:   4/20/2024       Continue to work on diet, exercise (30 minutes, 5x/week), and weight loss for optimal cardiovascular health  Repeat A1C and spot MA in 3 months  Pt will talk with insurance to see of ozempic or mounjaro are covered for her diabetes   Follow up in 3 months          Electronically signed by JASSON Nolasco CNP on 4/23/2023 at 7:45 PM    Greater than 30 minutes was spent in face-to face contact with patient with greater than 50% in counseling and coordination of care.

## 2023-04-24 ENCOUNTER — PATIENT MESSAGE (OUTPATIENT)
Dept: FAMILY MEDICINE CLINIC | Age: 48
End: 2023-04-24

## 2023-04-24 DIAGNOSIS — E11.9 TYPE 2 DIABETES MELLITUS WITHOUT COMPLICATION, WITHOUT LONG-TERM CURRENT USE OF INSULIN (HCC): Primary | ICD-10-CM

## 2023-05-10 RX ORDER — ATORVASTATIN CALCIUM 10 MG/1
10 TABLET, FILM COATED ORAL DAILY
Qty: 90 TABLET | Refills: 1 | Status: SHIPPED | OUTPATIENT
Start: 2023-05-10

## 2023-05-26 NOTE — TELEPHONE ENCOUNTER
Continue current dose this month. Call blood sugars before refilling it next time. May be able to increase dose next month.   CG

## 2023-05-31 ENCOUNTER — OFFICE VISIT (OUTPATIENT)
Dept: INTERNAL MEDICINE CLINIC | Age: 48
End: 2023-05-31
Payer: COMMERCIAL

## 2023-05-31 VITALS — WEIGHT: 293 LBS | BODY MASS INDEX: 44.41 KG/M2 | HEIGHT: 68 IN

## 2023-05-31 DIAGNOSIS — E11.9 TYPE 2 DIABETES MELLITUS WITHOUT COMPLICATION, WITHOUT LONG-TERM CURRENT USE OF INSULIN (HCC): Primary | ICD-10-CM

## 2023-05-31 PROCEDURE — 97803 MED NUTRITION INDIV SUBSEQ: CPT | Performed by: DIETITIAN, REGISTERED

## 2023-05-31 NOTE — PATIENT INSTRUCTIONS
Bump up fresh fruit and vegetables. Measure activity - step counter OR duration of moderate intense activity. - goal for moderate activity 150 min/week - break down   - like your progression on distance or duration or intensity or frequency    Good idea to get back to food tracking using [employer] pal.   -You can bring printed food logging for 2 weeks to next dietitian appt.

## 2023-05-31 NOTE — PROGRESS NOTES
[address] [place] [address]. [address] [place] [address] Chad., [facility], [facility]  [phone] (phone)  [phone] (fax)    Patient Name: Christian Seymour. Date of Birth: [id number]. MRN: [id number]      Assessment: Patient is a 57 y.o. female seen for follow-up MNT visit for Type 2 DB.     -Nutritionally relevant labs:   Lab Results   Component Value Date/Time    LABA1C 6.9 (H) 04/18/2023 09:20 AM    LABA1C 9.1 (H) 01/12/2023 05:41 PM    GLUCOSE 180 (H) 12/30/2022 01:48 PM    GLUCOSE 97 12/20/2021 02:00 PM    GLUCOSE 86 03/03/2017 08:32 AM    GLUCOSE 83 04/27/2016 03:48 PM    CHOL 215 (H) 12/30/2022 01:48 PM    HDL 36 12/30/2022 01:48 PM    LDLCALC 119 12/30/2022 01:48 PM    TRIG 187 04/18/2023 09:20 AM     Started 2nd month of Mounjaro 2.5 mg  Metformin 1000 mg nightly  -Blood sugar trends: Pt checking once daily FBS. FBS: 81 - 111. Lowest 71    Pt works from home. Lives alone. Is on a budget.  -Food recall: Reviewed food logging on [employer] pdf sent to [address] email. Breakfast: 9-10 am:  SF cookie OR sc eggs with cheese OR Cake. Lunch: 1-2 pm:  Ham and cheese sandwich OR Pizza OR 6\"Sub OR green beans and mashed potatoes and sometimes with cottage c heese   Dinner: 2 chicken tacos using Keto tortilla OR [employer] Grilled chicken sandwich with lettuce no owusu or cheese OR [place] - extra shrimp, rice and cheese  Snacks: not indicated on food tracking report    Has cut back on cheese. Exercise 2x/week taking a walk. 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 -Main Beverages: water. Diet dr pepper in the morning.    -Impression of Dietary Intake: on average, 2-3 meals per day, on average, 2+ fast food meals per week, low fiber, high fat/ cholesterol, lacking routine intake of fruits and vegetables    Current Outpatient Medications on File Prior to Visit   Medication Sig Dispense Refill    Tirzepatide (MOUNJARO) 2.5 MG/0.5ML SOPN SC injection Inject 0.5 mLs into the skin once a

## 2023-07-19 ENCOUNTER — HOSPITAL ENCOUNTER (OUTPATIENT)
Age: 48
Discharge: HOME | End: 2023-07-19
Payer: COMMERCIAL

## 2023-07-19 DIAGNOSIS — E11.9 TYPE 2 DIABETES MELLITUS WITHOUT COMPLICATION, WITHOUT LONG-TERM CURRENT USE OF INSULIN (HCC): ICD-10-CM

## 2023-07-19 LAB
BUN SERPL-MCNC: 13 MG/DL (ref 7–22)
CREAT SERPL-MCNC: 0.7 MG/DL (ref 0.4–1.2)
CREAT UR-MCNC: 112.5 MG/DL
GFR SERPL CREATININE-BSD FRML MDRD: > 60 ML/MIN/1.73M2
MICROALBUMIN UR-MCNC: < 1.2 MG/DL
MICROALBUMIN/CREAT RATIO PNL UR: 11 MG/G (ref 0–30)
POTASSIUM SERPL-SCNC: 4.1 MEQ/L (ref 3.5–5.2)
SODIUM SERPL-SCNC: 136 MEQ/L (ref 135–145)

## 2023-07-19 PROCEDURE — 82565 ASSAY OF CREATININE: CPT

## 2023-07-19 PROCEDURE — 84132 ASSAY OF SERUM POTASSIUM: CPT

## 2023-07-19 PROCEDURE — 84520 ASSAY OF UREA NITROGEN: CPT

## 2023-07-19 PROCEDURE — 36415 COLL VENOUS BLD VENIPUNCTURE: CPT

## 2023-07-19 PROCEDURE — 83036 HEMOGLOBIN GLYCOSYLATED A1C: CPT

## 2023-07-19 PROCEDURE — 84295 ASSAY OF SERUM SODIUM: CPT

## 2023-07-19 PROCEDURE — 82043 UR ALBUMIN QUANTITATIVE: CPT

## 2023-07-19 RX ORDER — LORATADINE 10 MG/1
TABLET ORAL
Qty: 90 TABLET | Refills: 3 | Status: SHIPPED | OUTPATIENT
Start: 2023-07-19

## 2023-07-19 RX ORDER — CALCIUM POLYCARBOPHIL 625 MG
TABLET ORAL
Qty: 360 TABLET | Refills: 3 | COMMUNITY
Start: 2023-07-19

## 2023-07-19 NOTE — TELEPHONE ENCOUNTER
This medication refill is regarding a MyChart request. Refill requested by patient. Requested Prescriptions     Pending Prescriptions Disp Refills    polycarbophil (FIBERCON) 625 MG tablet 360 tablet 3     Sig: Take tablets twice a day    loratadine (CLARITIN) 10 MG tablet 90 tablet 3     Sig: TAKE 1 TABLET BY MOUTH EVERY DAY     Date of last visit: 4/21/2023   Date of next visit: 7/21/2023  Date of last refill:    -Eliu Yingo 9/10/21 #360/3   -Claritin 11/30/22 #90/3  Pharmacy Name: [address] [place] [address]    Rx verified, ordered and set to EP.

## 2023-07-20 LAB
DEPRECATED MEAN GLUCOSE BLD GHB EST-ACNC: 102 MG/DL (ref 70–126)
HBA1C MFR BLD HPLC: 5.4 % (ref 4.4–6.4)

## 2023-07-21 ENCOUNTER — OFFICE VISIT (OUTPATIENT)
Dept: FAMILY MEDICINE CLINIC | Age: 48
End: 2023-07-21
Payer: COMMERCIAL

## 2023-07-21 VITALS
BODY MASS INDEX: 46.68 KG/M2 | SYSTOLIC BLOOD PRESSURE: 116 MMHG | TEMPERATURE: 97.6 F | WEIGHT: 293 LBS | RESPIRATION RATE: 16 BRPM | HEART RATE: 76 BPM | DIASTOLIC BLOOD PRESSURE: 80 MMHG

## 2023-07-21 DIAGNOSIS — E11.9 TYPE 2 DIABETES MELLITUS WITHOUT COMPLICATION, WITHOUT LONG-TERM CURRENT USE OF INSULIN (HCC): ICD-10-CM

## 2023-07-21 DIAGNOSIS — E78.2 ELEVATED TRIGLYCERIDES WITH HIGH CHOLESTEROL: ICD-10-CM

## 2023-07-21 DIAGNOSIS — K58.9 IRRITABLE BOWEL SYNDROME, UNSPECIFIED TYPE: Primary | ICD-10-CM

## 2023-07-21 PROCEDURE — 3044F HG A1C LEVEL LT 7.0%: CPT | Performed by: NURSE PRACTITIONER

## 2023-07-21 PROCEDURE — 2022F DILAT RTA XM EVC RTNOPTHY: CPT | Performed by: NURSE PRACTITIONER

## 2023-07-21 PROCEDURE — 4004F PT TOBACCO SCREEN RCVD TLK: CPT | Performed by: NURSE PRACTITIONER

## 2023-07-21 PROCEDURE — G8427 DOCREV CUR MEDS BY ELIG CLIN: HCPCS | Performed by: NURSE PRACTITIONER

## 2023-07-21 PROCEDURE — G8417 CALC BMI ABV UP PARAM F/U: HCPCS | Performed by: NURSE PRACTITIONER

## 2023-07-21 PROCEDURE — 99214 OFFICE O/P EST MOD 30 MIN: CPT | Performed by: NURSE PRACTITIONER

## 2023-07-21 RX ORDER — TRIAMCINOLONE ACETONIDE 1 MG/G
CREAM TOPICAL
COMMUNITY
Start: 2023-06-05

## 2023-07-21 RX ORDER — ATORVASTATIN CALCIUM 10 MG/1
10 TABLET, FILM COATED ORAL DAILY
Qty: 90 TABLET | Refills: 1 | Status: SHIPPED | OUTPATIENT
Start: 2023-07-21

## 2023-07-21 RX ORDER — METFORMIN HYDROCHLORIDE 500 MG/1
500 TABLET, EXTENDED RELEASE ORAL
Qty: 90 TABLET | Refills: 1 | Status: SHIPPED | OUTPATIENT
Start: 2023-07-21

## 2023-07-21 ASSESSMENT — ENCOUNTER SYMPTOMS
NAUSEA: 0
SORE THROAT: 0
COUGH: 0
ABDOMINAL PAIN: 0
DIARRHEA: 0
SHORTNESS OF BREATH: 0
WHEEZING: 0
SINUS PAIN: 0
BACK PAIN: 0
TROUBLE SWALLOWING: 0
FACIAL SWELLING: 0
VOMITING: 0
COLOR CHANGE: 0

## 2023-07-21 NOTE — PROGRESS NOTES
symptoms of irregular blood glucose. Dispense sufficient amount for indicated testing frequency plus additional to accommodate PRN testing needs. Max daily: 4 Yes JASSON Waters CNP   cetirizine (ZYRTEC) 10 MG tablet TAKE 1 TABLET BY MOUTH EVERY DAY Yes JASSON Lee CNP   betamethasone dipropionate 0.05 % lotion APPLY TO SCALP AND EARS DAILY AT BEDTIME AS NEEDED Yes Historical Provider, MD   ketoconazole (NIZORAL) 2 % shampoo  Yes Historical Provider, MD   glucose monitoring (FREESTYLE FREEDOM) kit 1 kit by Does not apply route daily Any meter covered by insurance Yes JASSON Waters CNP   Lancets MISC 1 each by Does not apply route 2 times daily Yes JASSON Waters CNP   Ketoconazole 2 % GEL Apply topically to affected area twice daily Yes JASSON Waters CNP   betamethasone valerate (VALISONE) 0.1 % cream APPLY TO AFFECTED AREA TWICE A DAY Yes JASSON Mack CNP   spironolactone (ALDACTONE) 100 MG tablet Take 1 tablet by mouth daily Yes Historical Provider, MD   B Complex Vitamins (VITAMIN B COMPLEX PO) Take by mouth daily Yes Historical Provider, MD   estradiol (ESTRACE) 1 MG tablet   Historical Provider, MD   SUMAtriptan (IMITREX) 100 MG tablet Take 1 tablet by mouth once as needed for Migraine  JASSON Waters CNP   Cholecalciferol (VITAMIN D3 PO) Take by mouth daily  Patient not taking: Reported on 2023  Historical Provider, MD   minocycline (MINOCIN;DYNACIN) 100 MG capsule Take 1 capsule by mouth daily  Historical Provider, MD   Multiple Vitamin (MULTIVITAMIN PO) Take  by mouth daily.     Patient not taking: Reported on 2023  Historical Provider, MD        Social History     Tobacco Use    Smoking status: Some Days     Packs/day: 0.25     Years: 4.00     Pack years: 1.00     Types: Cigarettes     Last attempt to quit: 2016     Years since quittin.5    Smokeless tobacco: Never   Substance Use Topics    Alcohol use: No     Comment: rare

## 2023-09-06 ENCOUNTER — OFFICE VISIT (OUTPATIENT)
Dept: INTERNAL MEDICINE CLINIC | Age: 48
End: 2023-09-06

## 2023-09-06 VITALS — WEIGHT: 285 LBS | HEIGHT: 68 IN | BODY MASS INDEX: 43.19 KG/M2

## 2023-09-06 DIAGNOSIS — E11.9 TYPE 2 DIABETES MELLITUS WITHOUT COMPLICATION, WITHOUT LONG-TERM CURRENT USE OF INSULIN (HCC): Primary | ICD-10-CM

## 2023-09-06 PROCEDURE — NBSRV NON-BILLABLE SERVICE: Performed by: DIETITIAN, REGISTERED

## 2023-09-06 RX ORDER — GLUCOSAMINE/D3/BOSWELLIA SERRA 1500MG-400
10000 TABLET ORAL
COMMUNITY

## 2023-09-06 NOTE — PROGRESS NOTES
[address] [facility] [place]  [address] [facility], [place]. [address] [facility], [address] [place] [address]  [phone] (phone)  [phone] (fax)    Patient Name: Amy Munoz. Date of Birth: [id number]. MRN: [id number]      Assessment: Patient is a 57 y.o. female seen for follow-up MNT visit for Type 2 DB.     -Nutritionally relevant labs:   Lab Results   Component Value Date/Time    LABA1C 5.4 07/19/2023 07:32 PM    LABA1C 6.9 (H) 04/18/2023 09:20 AM    LABA1C 9.1 (H) 01/12/2023 05:41 PM    GLUCOSE 180 (H) 12/30/2022 01:48 PM    GLUCOSE 97 12/20/2021 02:00 PM    GLUCOSE 86 03/03/2017 08:32 AM    GLUCOSE 83 04/27/2016 03:48 PM    CHOL 215 (H) 12/30/2022 01:48 PM    HDL 36 12/30/2022 01:48 PM    LDLCALC 119 12/30/2022 01:48 PM    TRIG 187 04/18/2023 09:20 AM   Provider f/u visit: 7/21/23  ASSESSMENT/PLAN:  1. Type 2 diabetes mellitus without complication, without long-term current use of insulin (HCC)  - metFORMIN (GLUCOPHAGE-XR) 500 MG extended release tablet; Take 1 tablet by mouth Daily with supper  Dispense: 90 tablet; Refill: 1  - Tirzepatide (MOUNJARO) 5 MG/0.5ML SOPN SC injection; Inject 0.5 mLs into the skin once a week  Dispense: 4 Adjustable Dose Pre-filled Pen Syringe; Refill: 0     2. Irritable bowel syndrome, unspecified type     3. Elevated triglycerides with high cholesterol  - atorvastatin (LIPITOR) 10 MG tablet; Take 1 tablet by mouth daily  Dispense: 90 tablet; Refill: 1     - Continue current dose of Monujaro, new RX sent. - Reduce Metformin to 500 mg daily.   - Repeat A1C at [facility] in 3 months. Today's Visit:  DB Meds:  Mounjaro - 7.5 mg weekly and has a 3 month supply and is on her 3rd shot. Metformin - 500 mg daily with dinner - which has been reduced.    -Blood sugar trends: Not required to check routinely since Hgb AIC WNL.   Will check when not feeling well.    -Food recall:   Breakfast: Protein Shake - Premier brand OR Members [employer]vantage brand from [address] [place] [address] Way

## 2023-10-05 ENCOUNTER — PATIENT MESSAGE (OUTPATIENT)
Dept: FAMILY MEDICINE CLINIC | Age: 48
End: 2023-10-05

## 2023-10-20 ENCOUNTER — OFFICE VISIT (OUTPATIENT)
Dept: FAMILY MEDICINE CLINIC | Age: 48
End: 2023-10-20
Payer: COMMERCIAL

## 2023-10-20 VITALS
RESPIRATION RATE: 16 BRPM | DIASTOLIC BLOOD PRESSURE: 70 MMHG | SYSTOLIC BLOOD PRESSURE: 116 MMHG | WEIGHT: 278 LBS | HEIGHT: 68 IN | BODY MASS INDEX: 42.13 KG/M2 | HEART RATE: 80 BPM

## 2023-10-20 DIAGNOSIS — E66.01 MORBID OBESITY (HCC): ICD-10-CM

## 2023-10-20 DIAGNOSIS — Z13.220 SCREENING, LIPID: ICD-10-CM

## 2023-10-20 DIAGNOSIS — Z13.1 SCREENING FOR DIABETES MELLITUS: ICD-10-CM

## 2023-10-20 DIAGNOSIS — Z23 NEED FOR INFLUENZA VACCINATION: ICD-10-CM

## 2023-10-20 DIAGNOSIS — E11.9 TYPE 2 DIABETES MELLITUS WITHOUT COMPLICATION, WITHOUT LONG-TERM CURRENT USE OF INSULIN (HCC): Primary | ICD-10-CM

## 2023-10-20 DIAGNOSIS — E78.2 ELEVATED TRIGLYCERIDES WITH HIGH CHOLESTEROL: ICD-10-CM

## 2023-10-20 LAB — HBA1C MFR BLD: 5 % (ref 4.3–5.7)

## 2023-10-20 PROCEDURE — 99214 OFFICE O/P EST MOD 30 MIN: CPT | Performed by: NURSE PRACTITIONER

## 2023-10-20 PROCEDURE — 2022F DILAT RTA XM EVC RTNOPTHY: CPT | Performed by: NURSE PRACTITIONER

## 2023-10-20 PROCEDURE — 90471 IMMUNIZATION ADMIN: CPT | Performed by: NURSE PRACTITIONER

## 2023-10-20 PROCEDURE — G8417 CALC BMI ABV UP PARAM F/U: HCPCS | Performed by: NURSE PRACTITIONER

## 2023-10-20 PROCEDURE — G8427 DOCREV CUR MEDS BY ELIG CLIN: HCPCS | Performed by: NURSE PRACTITIONER

## 2023-10-20 PROCEDURE — 1036F TOBACCO NON-USER: CPT | Performed by: NURSE PRACTITIONER

## 2023-10-20 PROCEDURE — 90674 CCIIV4 VAC NO PRSV 0.5 ML IM: CPT | Performed by: NURSE PRACTITIONER

## 2023-10-20 PROCEDURE — 3044F HG A1C LEVEL LT 7.0%: CPT | Performed by: NURSE PRACTITIONER

## 2023-10-20 PROCEDURE — G8482 FLU IMMUNIZE ORDER/ADMIN: HCPCS | Performed by: NURSE PRACTITIONER

## 2023-10-20 PROCEDURE — 83036 HEMOGLOBIN GLYCOSYLATED A1C: CPT | Performed by: NURSE PRACTITIONER

## 2023-10-20 RX ORDER — ATORVASTATIN CALCIUM 10 MG/1
10 TABLET, FILM COATED ORAL DAILY
Qty: 90 TABLET | Refills: 3 | Status: SHIPPED | OUTPATIENT
Start: 2023-10-20

## 2023-10-20 RX ORDER — MELOXICAM 15 MG/1
TABLET ORAL
Qty: 90 TABLET | Refills: 3 | Status: SHIPPED | OUTPATIENT
Start: 2023-10-20

## 2023-10-20 RX ORDER — TIRZEPATIDE 10 MG/.5ML
10 INJECTION, SOLUTION SUBCUTANEOUS WEEKLY
Qty: 2 ML | Refills: 2 | Status: SHIPPED | OUTPATIENT
Start: 2023-10-20

## 2023-10-20 ASSESSMENT — ENCOUNTER SYMPTOMS
CONSTIPATION: 0
SHORTNESS OF BREATH: 0
BLOOD IN STOOL: 0
DIARRHEA: 0
NAUSEA: 0
VOMITING: 0

## 2023-10-20 NOTE — PROGRESS NOTES
Immunization(s) given during visit:    Immunizations Administered       Name Date Dose Route    Influenza, FLUCELVAX, (age 10 mo+), MDCK, PF, 0.5mL 10/20/2023 0.5 mL Intramuscular    Site: Deltoid- Left    Lot: [id number]    NDC: 15202-843-60
vaccine given  A1C, FLP TSH, Free T4, CBC, CMP in 3 months  Continue to work on diet, exercise (30 minutes, 5x/week), and weight loss for optimal cardiovascular health  Increase Mounjaro to 10 mg daily  Stop metformin as A1C is doing very well with mounjaro and weight loss  Follow up in 3 months          Electronically signed by JASSON Fletcher CNP on 10/22/2023 at 6:34 PM    Greater than 30 minutes was spent in face-to face contact with patient with greater than 50% in counseling and coordination of care.

## 2023-10-27 NOTE — TELEPHONE ENCOUNTER
This medication refill is regarding a MyChart request. Refill requested by patient. Requested Prescriptions     Pending Prescriptions Disp Refills    loratadine (CLARITIN) 10 MG tablet 90 tablet 3     Sig: TAKE 1 TABLET BY MOUTH EVERY DAY     Date of last visit: 10/20/2023   Date of next visit: 1/22/2024  Date of last refill: 7/19/2023 #90/3 - local pharmacy  Pharmacy Name: [facility]    Rx verified, ordered and set to EP.

## 2023-10-29 RX ORDER — LORATADINE 10 MG/1
TABLET ORAL
Qty: 90 TABLET | Refills: 3 | Status: SHIPPED | OUTPATIENT
Start: 2023-10-29

## 2023-11-02 RX ORDER — LORATADINE 10 MG/1
TABLET ORAL
Qty: 90 TABLET | Refills: 3 | Status: SHIPPED | OUTPATIENT
Start: 2023-11-02

## 2023-11-02 NOTE — TELEPHONE ENCOUNTER
This medication refill is regarding a MyChart request. Refill requested by patient. Requested Prescriptions     Pending Prescriptions Disp Refills    loratadine (CLARITIN) 10 MG tablet 90 tablet 3     Sig: TAKE 1 TABLET BY MOUTH EVERY DAY       Date of last visit: 10/20/2023   Date of next visit: 1/22/2024  Date of last refill:   Pharm[facility]: [facility], [facility] DOESN'T HAVE IN STOCK    Last Lipid Panel:    Lab Results   Component Value Date/Time    CHOL 215 12/30/2022 01:48 PM    TRIG 187 04/18/2023 09:20 AM    HDL 36 12/30/2022 01:48 PM    LDLCALC 119 12/30/2022 01:48 PM     Last CMP:   Lab Results   Component Value Date     07/19/2023    K 4.1 07/19/2023    CL 98 12/30/2022    CO2 25 12/30/2022    BUN 13 07/19/2023    CREATININE 0.7 07/19/2023    GLUCOSE 180 (H) 12/30/2022    CALCIUM 9.7 12/30/2022    PROT 6.9 04/18/2023    LABALBU 4.6 04/18/2023    BILITOT 0.5 04/18/2023    ALKPHOS 105 04/18/2023    AST 26 04/18/2023    ALT 32 04/18/2023    LABGLOM >60 07/19/2023       Last Thyroid:    Lab Results   Component Value Date    TSH 1.720 12/30/2022    T4FREE 1.07 12/30/2022     Last Hemoglobin A1C:    Lab Results   Component Value Date/Time    LABA1C 5.0 10/20/2023 10:04 AM    LABA1C 5.4 07/19/2023 07:32 PM    AVGG 102 07/19/2023 07:32 PM       Rx verified, ordered and set to EP.

## 2023-11-08 ENCOUNTER — PATIENT MESSAGE (OUTPATIENT)
Dept: FAMILY MEDICINE CLINIC | Age: 48
End: 2023-11-08

## 2023-11-08 ENCOUNTER — HOSPITAL ENCOUNTER (OUTPATIENT)
Dept: MAMMOGRAPHY | Age: 48
Discharge: HOME | End: 2023-11-08
Payer: COMMERCIAL

## 2023-11-08 DIAGNOSIS — Z12.39 SCREENING BREAST EXAMINATION: ICD-10-CM

## 2023-11-08 PROCEDURE — 77063 BREAST TOMOSYNTHESIS BI: CPT

## 2023-11-08 RX ORDER — TIRZEPATIDE 12.5 MG/.5ML
12.5 INJECTION, SOLUTION SUBCUTANEOUS WEEKLY
Qty: 2 ML | Refills: 1 | Status: SHIPPED | OUTPATIENT
Start: 2023-11-08

## 2023-12-07 DIAGNOSIS — E11.9 TYPE 2 DIABETES MELLITUS WITHOUT COMPLICATION, WITHOUT LONG-TERM CURRENT USE OF INSULIN (HCC): ICD-10-CM

## 2023-12-07 RX ORDER — METFORMIN HYDROCHLORIDE 500 MG/1
500 TABLET, EXTENDED RELEASE ORAL
Qty: 90 TABLET | Refills: 3 | OUTPATIENT
Start: 2023-12-07

## 2023-12-07 NOTE — TELEPHONE ENCOUNTER
Request sent from [email or web address] for refill of metformin. Per office noted dated 10/20/23, metformin discontinued as sugars well controlled with mounjaro. This request refused.

## 2023-12-26 ENCOUNTER — OFFICE VISIT (OUTPATIENT)
Dept: FAMILY MEDICINE CLINIC | Age: 48
End: 2023-12-26
Payer: COMMERCIAL

## 2023-12-26 ENCOUNTER — TELEPHONE (OUTPATIENT)
Dept: FAMILY MEDICINE CLINIC | Age: 48
End: 2023-12-26

## 2023-12-26 VITALS
OXYGEN SATURATION: 97 % | HEART RATE: 96 BPM | WEIGHT: 255.4 LBS | BODY MASS INDEX: 38.71 KG/M2 | DIASTOLIC BLOOD PRESSURE: 78 MMHG | SYSTOLIC BLOOD PRESSURE: 110 MMHG | RESPIRATION RATE: 16 BRPM | HEIGHT: 68 IN

## 2023-12-26 DIAGNOSIS — N64.4 BREAST PAIN, RIGHT: Primary | ICD-10-CM

## 2023-12-26 DIAGNOSIS — N64.52 BREAST DISCHARGE: ICD-10-CM

## 2023-12-26 PROCEDURE — G8482 FLU IMMUNIZE ORDER/ADMIN: HCPCS | Performed by: NURSE PRACTITIONER

## 2023-12-26 PROCEDURE — 99213 OFFICE O/P EST LOW 20 MIN: CPT | Performed by: NURSE PRACTITIONER

## 2023-12-26 PROCEDURE — G8417 CALC BMI ABV UP PARAM F/U: HCPCS | Performed by: NURSE PRACTITIONER

## 2023-12-26 PROCEDURE — G8427 DOCREV CUR MEDS BY ELIG CLIN: HCPCS | Performed by: NURSE PRACTITIONER

## 2023-12-26 PROCEDURE — 1036F TOBACCO NON-USER: CPT | Performed by: NURSE PRACTITIONER

## 2023-12-26 RX ORDER — CLINDAMYCIN PHOSPHATE 11.9 MG/ML
SOLUTION TOPICAL
COMMUNITY
Start: 2023-12-21

## 2023-12-26 RX ORDER — BENZOYL PEROXIDE 50 MG/ML
LIQUID TOPICAL
COMMUNITY
Start: 2023-12-21

## 2023-12-26 RX ORDER — AMOXICILLIN AND CLAVULANATE POTASSIUM 875; 125 MG/1; MG/1
TABLET, FILM COATED ORAL
COMMUNITY
Start: 2023-12-20

## 2023-12-26 RX ORDER — FLUCONAZOLE 150 MG/1
TABLET ORAL
COMMUNITY
Start: 2023-12-20

## 2023-12-26 RX ORDER — MINOCYCLINE HYDROCHLORIDE 100 MG/1
100 CAPSULE ORAL DAILY
COMMUNITY

## 2023-12-26 NOTE — PROGRESS NOTES
focal deficit present. Mental Status: She is alert and oriented to person, place, and time. Coordination: Coordination normal.   Psychiatric:         Mood and Affect: Mood normal.         Behavior: Behavior normal.         Thought Content: Thought content normal.         Judgment: Judgment normal.         Assessment/Plan:      Jeannetta Boast was seen today for breast pain. Diagnoses and all orders for this visit:    Breast pain, right  -     CLARA DIGITAL DIAGNOSTIC W OR WO CAD BILATERAL; Future  -     US BREAST COMPLETE RIGHT; Future    Breast discharge  -     CLARA DIGITAL DIAGNOSTIC W OR WO CAD BILATERAL; Future  -     US BREAST COMPLETE RIGHT; Future    - Continue Augmentin for sinus infection, this will also help if breast pain/discharge was from an abscess. - Mammogram and breast US scheduled for 12/27/23 at 0800.    - Call office with any questions or concerns, or if symptoms are getting worse or changing      Return if symptoms worsen or fail to improve. Patient given educational materials - see patient instructions. Discussed use, benefit, and side effects of prescribed medications. All patient questions answered. Pt voiced understanding.         Electronically signed by JASSON Russo CNP on 12/27/2023 at 7:43 AM

## 2023-12-26 NOTE — TELEPHONE ENCOUNTER
Pt c/o right breast pain/discomfort that started a week ago. Pt states that she has also had some swelling, color changes in her nipple, along with discharge from the nipple that started yesterday with pus and blood. Pt called [facility] since she recently had a mammo and they recommended that an ultrasound be ordered of the area so they could get her in for an appointment. Pt is requesting the order for the ultrasound. [address] [facility] for order? Please advise. Pt would like a call back with response at [phone].

## 2023-12-26 NOTE — TELEPHONE ENCOUNTER
OK for an appt in office to discuss symptoms and document, further orders can be placed at that time.   -WS

## 2023-12-27 ENCOUNTER — TELEPHONE (OUTPATIENT)
Dept: FAMILY MEDICINE CLINIC | Age: 48
End: 2023-12-27

## 2023-12-27 ENCOUNTER — HOSPITAL ENCOUNTER (OUTPATIENT)
Dept: WOMENS IMAGING | Age: 48
Discharge: HOME | End: 2023-12-27
Payer: COMMERCIAL

## 2023-12-27 DIAGNOSIS — N64.52 BREAST DISCHARGE: ICD-10-CM

## 2023-12-27 DIAGNOSIS — N64.4 BREAST PAIN, RIGHT: ICD-10-CM

## 2023-12-27 PROCEDURE — 76642 ULTRASOUND BREAST LIMITED: CPT

## 2023-12-27 NOTE — TELEPHONE ENCOUNTER
----- Message from JASSON Motta CNP sent at 12/27/2023 11:19 AM EST -----  Please let pt know that her breast US was normal.  Most likely she had an infection to her breast and the Augmentin she was taking for her sinus infection also helped the breast infection. Finish the antibiotic and call office if pain returns.   -RODNEY

## 2023-12-27 NOTE — TELEPHONE ENCOUNTER
----- Message from JASSON Youngblood CNP sent at 12/27/2023 11:19 AM EST -----  Please let pt know that her breast US was normal.  Most likely she had an infection to her breast and the Augmentin she was taking for her sinus infection also helped the breast infection. Finish the antibiotic and call office if pain returns.   -RODNEY

## 2023-12-28 ENCOUNTER — PATIENT MESSAGE (OUTPATIENT)
Dept: FAMILY MEDICINE CLINIC | Age: 48
End: 2023-12-28

## 2023-12-28 NOTE — TELEPHONE ENCOUNTER
From: Fanny Pimentel  To: Clint Crooks  Sent: 12/28/2023 4:36 PM EST  Subject: Upcoming Blood Tests    Do I need to go off of any vitamins or supplements before my blood tests in January?

## 2023-12-29 RX ORDER — TIRZEPATIDE 15 MG/.5ML
15 INJECTION, SOLUTION SUBCUTANEOUS WEEKLY
Qty: 6 ML | Refills: 1 | Status: SHIPPED | OUTPATIENT
Start: 2023-12-29

## 2023-12-29 RX ORDER — SUMATRIPTAN 100 MG/1
TABLET, FILM COATED ORAL
Qty: 24 TABLET | Refills: 0 | Status: SHIPPED | OUTPATIENT
Start: 2023-12-29

## 2023-12-29 NOTE — TELEPHONE ENCOUNTER
This medication refill is regarding a electronic request. Refill requested by patient. Requested Prescriptions     Pending Prescriptions Disp Refills    SUMAtriptan (IMITREX) 100 MG tablet [Pharmacy Med Name: SUMATRIPTAN  100MG  TAB] 24 tablet      Sig: TAKE 1 TABLET BY MOUTH ONCE  DAILY AS NEEDED FOR MIGRAINE(S)       Date of last visit: 12/26/2023   Date of next visit: 1/22/2024    Rx verified, ordered and set to EP.

## 2024-01-17 ENCOUNTER — HOSPITAL ENCOUNTER (OUTPATIENT)
Age: 49
Discharge: HOME | End: 2024-01-17
Payer: COMMERCIAL

## 2024-01-17 DIAGNOSIS — E11.9 TYPE 2 DIABETES MELLITUS WITHOUT COMPLICATION, WITHOUT LONG-TERM CURRENT USE OF INSULIN (HCC): ICD-10-CM

## 2024-01-17 DIAGNOSIS — Z13.1 SCREENING FOR DIABETES MELLITUS: ICD-10-CM

## 2024-01-17 DIAGNOSIS — E66.01 MORBID OBESITY (HCC): ICD-10-CM

## 2024-01-17 DIAGNOSIS — Z13.220 SCREENING, LIPID: ICD-10-CM

## 2024-01-17 LAB
ALBUMIN SERPL BCG-MCNC: 4.5 G/DL (ref 3.5–5.1)
ALP SERPL-CCNC: 115 U/L (ref 38–126)
ALT SERPL W/O P-5'-P-CCNC: 25 U/L (ref 11–66)
ANION GAP SERPL CALC-SCNC: 15 MEQ/L (ref 8–16)
AST SERPL-CCNC: 22 U/L (ref 5–40)
BASOPHILS ABSOLUTE: 0 THOU/MM3 (ref 0–0.1)
BASOPHILS NFR BLD AUTO: 0.8 %
BILIRUB SERPL-MCNC: 0.6 MG/DL (ref 0.3–1.2)
BUN SERPL-MCNC: 14 MG/DL (ref 7–22)
CALCIUM SERPL-MCNC: 10 MG/DL (ref 8.5–10.5)
CHLORIDE SERPL-SCNC: 102 MEQ/L (ref 98–111)
CHOLEST SERPL-MCNC: 142 MG/DL (ref 100–199)
CO2 SERPL-SCNC: 24 MEQ/L (ref 23–33)
CREAT SERPL-MCNC: 0.7 MG/DL (ref 0.4–1.2)
DEPRECATED MEAN GLUCOSE BLD GHB EST-ACNC: 90 MG/DL (ref 70–126)
DEPRECATED RDW RBC AUTO: 45.2 FL (ref 35–45)
EOSINOPHIL NFR BLD AUTO: 4.2 %
EOSINOPHILS ABSOLUTE: 0.3 THOU/MM3 (ref 0–0.4)
ERYTHROCYTE [DISTWIDTH] IN BLOOD BY AUTOMATED COUNT: 13.9 % (ref 11.5–14.5)
GFR SERPL CREATININE-BSD FRML MDRD: > 60 ML/MIN/1.73M2
GLUCOSE SERPL-MCNC: 84 MG/DL (ref 70–108)
HBA1C MFR BLD HPLC: 5 % (ref 4.4–6.4)
HCT VFR BLD AUTO: 44.4 % (ref 37–47)
HDLC SERPL-MCNC: 46 MG/DL
HGB BLD-MCNC: 14.4 GM/DL (ref 12–16)
IMM GRANULOCYTES # BLD AUTO: 0.02 THOU/MM3 (ref 0–0.07)
IMM GRANULOCYTES NFR BLD AUTO: 0.3 %
LDLC SERPL CALC-MCNC: 80 MG/DL
LYMPHOCYTES ABSOLUTE: 1.8 THOU/MM3 (ref 1–4.8)
LYMPHOCYTES NFR BLD AUTO: 29.4 %
MCH RBC QN AUTO: 28.9 PG (ref 26–33)
MCHC RBC AUTO-ENTMCNC: 32.4 GM/DL (ref 32.2–35.5)
MCV RBC AUTO: 89.2 FL (ref 81–99)
MONOCYTES ABSOLUTE: 0.5 THOU/MM3 (ref 0.4–1.3)
MONOCYTES NFR BLD AUTO: 7.8 %
NEUTROPHILS NFR BLD AUTO: 57.5 %
NRBC BLD AUTO-RTO: 0 /100 WBC
PLATELET # BLD AUTO: 265 THOU/MM3 (ref 130–400)
PMV BLD AUTO: 10.8 FL (ref 9.4–12.4)
POTASSIUM SERPL-SCNC: 4.1 MEQ/L (ref 3.5–5.2)
PROT SERPL-MCNC: 7.5 G/DL (ref 6.1–8)
RBC # BLD AUTO: 4.98 MILL/MM3 (ref 4.2–5.4)
SEGMENTED NEUTROPHILS ABSOLUTE COUNT: 3.6 THOU/MM3 (ref 1.8–7.7)
SODIUM SERPL-SCNC: 141 MEQ/L (ref 135–145)
T4 FREE SERPL-MCNC: 1.2 NG/DL (ref 0.93–1.76)
TRIGL SERPL-MCNC: 78 MG/DL (ref 0–199)
TSH SERPL DL<=0.005 MIU/L-ACNC: 1.57 UIU/ML (ref 0.4–4.2)
WBC # BLD AUTO: 6.2 THOU/MM3 (ref 4.8–10.8)

## 2024-01-17 PROCEDURE — 86038 ANTINUCLEAR ANTIBODIES: CPT

## 2024-01-17 PROCEDURE — 80053 COMPREHEN METABOLIC PANEL: CPT

## 2024-01-17 PROCEDURE — 84443 ASSAY THYROID STIM HORMONE: CPT

## 2024-01-17 PROCEDURE — 80061 LIPID PANEL: CPT

## 2024-01-17 PROCEDURE — 85025 COMPLETE CBC W/AUTO DIFF WBC: CPT

## 2024-01-17 PROCEDURE — 82607 VITAMIN B-12: CPT

## 2024-01-17 PROCEDURE — 82746 ASSAY OF FOLIC ACID SERUM: CPT

## 2024-01-17 PROCEDURE — 36415 COLL VENOUS BLD VENIPUNCTURE: CPT

## 2024-01-17 PROCEDURE — 83036 HEMOGLOBIN GLYCOSYLATED A1C: CPT

## 2024-01-17 PROCEDURE — 84439 ASSAY OF FREE THYROXINE: CPT

## 2024-01-18 LAB
FOLATE SERPL-MCNC: > 20 NG/ML (ref 4.8–24.2)
VIT B12 SERPL-MCNC: 1930 PG/ML (ref 211–911)

## 2024-01-19 LAB — NUCLEAR IGG SER QL IA: NORMAL

## 2024-01-22 ENCOUNTER — OFFICE VISIT (OUTPATIENT)
Dept: FAMILY MEDICINE CLINIC | Age: 49
End: 2024-01-22
Payer: COMMERCIAL

## 2024-01-22 VITALS
BODY MASS INDEX: 37.04 KG/M2 | SYSTOLIC BLOOD PRESSURE: 116 MMHG | HEIGHT: 68 IN | WEIGHT: 244.38 LBS | OXYGEN SATURATION: 98 % | DIASTOLIC BLOOD PRESSURE: 80 MMHG | HEART RATE: 78 BPM

## 2024-01-22 DIAGNOSIS — K58.9 IRRITABLE BOWEL SYNDROME, UNSPECIFIED TYPE: ICD-10-CM

## 2024-01-22 DIAGNOSIS — E11.9 TYPE 2 DIABETES MELLITUS WITHOUT COMPLICATION, WITHOUT LONG-TERM CURRENT USE OF INSULIN (HCC): Primary | ICD-10-CM

## 2024-01-22 DIAGNOSIS — E66.01 MORBID OBESITY (HCC): ICD-10-CM

## 2024-01-22 DIAGNOSIS — K21.9 GASTROESOPHAGEAL REFLUX DISEASE, UNSPECIFIED WHETHER ESOPHAGITIS PRESENT: ICD-10-CM

## 2024-01-22 PROCEDURE — G8482 FLU IMMUNIZE ORDER/ADMIN: HCPCS | Performed by: NURSE PRACTITIONER

## 2024-01-22 PROCEDURE — 1036F TOBACCO NON-USER: CPT | Performed by: NURSE PRACTITIONER

## 2024-01-22 PROCEDURE — G8427 DOCREV CUR MEDS BY ELIG CLIN: HCPCS | Performed by: NURSE PRACTITIONER

## 2024-01-22 PROCEDURE — G8417 CALC BMI ABV UP PARAM F/U: HCPCS | Performed by: NURSE PRACTITIONER

## 2024-01-22 PROCEDURE — 3044F HG A1C LEVEL LT 7.0%: CPT | Performed by: NURSE PRACTITIONER

## 2024-01-22 PROCEDURE — 99213 OFFICE O/P EST LOW 20 MIN: CPT | Performed by: NURSE PRACTITIONER

## 2024-01-22 PROCEDURE — 2022F DILAT RTA XM EVC RTNOPTHY: CPT | Performed by: NURSE PRACTITIONER

## 2024-01-22 ASSESSMENT — PATIENT HEALTH QUESTIONNAIRE - PHQ9
SUM OF ALL RESPONSES TO PHQ QUESTIONS 1-9: 0
1. LITTLE INTEREST OR PLEASURE IN DOING THINGS: 0
2. FEELING DOWN, DEPRESSED OR HOPELESS: 0
SUM OF ALL RESPONSES TO PHQ QUESTIONS 1-9: 0
SUM OF ALL RESPONSES TO PHQ QUESTIONS 1-9: 0
SUM OF ALL RESPONSES TO PHQ9 QUESTIONS 1 & 2: 0
SUM OF ALL RESPONSES TO PHQ QUESTIONS 1-9: 0

## 2024-01-22 ASSESSMENT — ENCOUNTER SYMPTOMS
SHORTNESS OF BREATH: 0
CONSTIPATION: 0
VOMITING: 0
BLOOD IN STOOL: 0
NAUSEA: 0
DIARRHEA: 0

## 2024-01-22 NOTE — PROGRESS NOTES
Chief Complaint   Patient presents with    3 Month Follow-Up     Patient would like to discuss lab results        SUBJECTIVE     Elizabeth Veronica is a 48 y.o.female      Pt presents for follow up of type 2 diabetes.    Has lost 150 lbs so far  Has not been monitoring BS as she has been doing well.  Lots of protein drinks.    Pt is monitoring home BS daily.   Following with Dr Roberson's office for hair loss.    Review of Systems   Constitutional:  Negative for chills, diaphoresis and fever.   Respiratory:  Negative for shortness of breath.    Cardiovascular:  Negative for chest pain, palpitations and leg swelling.   Gastrointestinal:  Negative for blood in stool, constipation, diarrhea, nausea and vomiting.   Genitourinary:  Negative for dysuria and hematuria.   Musculoskeletal:  Negative for myalgias.   Neurological:  Negative for dizziness and headaches.   All other systems reviewed and are negative.      OBJECTIVE     /80   Pulse 78   Ht 1.727 m (5' 8\")   Wt 110.8 kg (244 lb 6 oz)   SpO2 98%   BMI 37.16 kg/m²   Wt Readings from Last 3 Encounters:   01/22/24 110.8 kg (244 lb 6 oz)   12/26/23 115.8 kg (255 lb 6.4 oz)   10/20/23 126.1 kg (278 lb)       Physical Exam  Vitals and nursing note reviewed.   Constitutional:       Appearance: She is well-developed. She is obese.   HENT:      Head: Normocephalic and atraumatic.      Right Ear: External ear normal.      Left Ear: External ear normal.      Nose: Nose normal.   Eyes:      Conjunctiva/sclera: Conjunctivae normal.      Pupils: Pupils are equal, round, and reactive to light.   Cardiovascular:      Rate and Rhythm: Normal rate and regular rhythm.      Pulses:           Dorsalis pedis pulses are 2+ on the right side and 2+ on the left side.      Heart sounds: Normal heart sounds.   Pulmonary:      Effort: Pulmonary effort is normal.      Breath sounds: Normal breath sounds.   Abdominal:      General: Bowel sounds are normal.      Palpations: Abdomen is

## 2024-01-26 RX ORDER — LORATADINE 10 MG/1
TABLET ORAL
Qty: 90 TABLET | Refills: 3 | Status: SHIPPED | OUTPATIENT
Start: 2024-01-26

## 2024-01-26 NOTE — TELEPHONE ENCOUNTER
Request sent via [employer] for refill of loratadine 10 mg qd.  Last seen 1/22/24, next appt 7/22/24.  Med verified.  Order pended.

## 2024-02-26 DIAGNOSIS — E11.9 TYPE 2 DIABETES MELLITUS WITHOUT COMPLICATION, WITHOUT LONG-TERM CURRENT USE OF INSULIN (HCC): ICD-10-CM

## 2024-02-28 RX ORDER — METFORMIN HYDROCHLORIDE 500 MG/1
500 TABLET, EXTENDED RELEASE ORAL
Qty: 90 TABLET | Refills: 3 | OUTPATIENT
Start: 2024-02-28

## 2024-02-28 NOTE — TELEPHONE ENCOUNTER
Metformin refused due to being discontinued 10/20/2023 by Bela Snow CNP due to therapy being completed. Pt does not need a refill of Metformin at this time.

## 2024-04-02 RX ORDER — MONTELUKAST SODIUM 10 MG/1
10 TABLET ORAL NIGHTLY
Qty: 90 TABLET | Refills: 3 | Status: SHIPPED | OUTPATIENT
Start: 2024-04-02

## 2024-04-02 RX ORDER — ESCITALOPRAM OXALATE 20 MG/1
20 TABLET ORAL DAILY
Qty: 90 TABLET | Refills: 3 | Status: SHIPPED | OUTPATIENT
Start: 2024-04-02

## 2024-04-02 NOTE — TELEPHONE ENCOUNTER
This medication refill is regarding a electronic request. Refill requested by  pharmacy .    Requested Prescriptions     Pending Prescriptions Disp Refills    montelukast (SINGULAIR) 10 MG tablet [Pharmacy Med Name: Montelukast Sodium 10 MG Oral Tablet] 90 tablet 3     Sig: TAKE 1 TABLET BY MOUTH NIGHTLY    escitalopram (LEXAPRO) 20 MG tablet [Pharmacy Med Name: Escitalopram Oxalate 20 MG Oral Tablet] 90 tablet 3     Sig: TAKE 1 TABLET BY MOUTH DAILY       Date of last visit: 1/22/2024   Date of next visit: 7/22/2024  Date of last refill: 3/23/23  Pharmacy Name:     Last Lipid Panel:    Lab Results   Component Value Date/Time    CHOL 142 01/17/2024 10:26 AM    TRIG 78 01/17/2024 10:26 AM    HDL 46 01/17/2024 10:26 AM    LDLCALC 80 01/17/2024 10:26 AM     Last CMP:   Lab Results   Component Value Date     01/17/2024    K 4.1 01/17/2024     01/17/2024    CO2 24 01/17/2024    BUN 14 01/17/2024    CREATININE 0.7 01/17/2024    GLUCOSE 84 01/17/2024    CALCIUM 10.0 01/17/2024    PROT 7.5 01/17/2024    LABALBU 4.5 01/17/2024    BILITOT 0.6 01/17/2024    ALKPHOS 115 01/17/2024    AST 22 01/17/2024    ALT 25 01/17/2024    LABGLOM >60 01/17/2024       Last Thyroid:    Lab Results   Component Value Date    TSH 1.570 01/17/2024    T4FREE 1.20 01/17/2024     Last Hemoglobin A1C:    Lab Results   Component Value Date/Time    LABA1C 5.0 01/17/2024 10:25 AM       Rx verified, ordered and set to EP.

## 2024-05-02 RX ORDER — TIRZEPATIDE 15 MG/.5ML
INJECTION, SOLUTION SUBCUTANEOUS
Qty: 6 ML | Refills: 3 | Status: SHIPPED | OUTPATIENT
Start: 2024-05-02

## 2024-05-02 NOTE — TELEPHONE ENCOUNTER
This medication refill is regarding a electronic request. Refill requested by  pharmacy .    Requested Prescriptions     Pending Prescriptions Disp Refills    MOUNJARO 15 MG/0.5ML SOPN SC injection [Pharmacy Med Name: MOUNJARO PEN 15MG/0.5ML] 6 mL 3     Sig: INJECT THE CONTENTS OF ONE PEN  SUBCUTANEOUSLY WEEKLY AS  DIRECTED       Date of last visit: 1/22/2024   Date of next visit: 7/22/2024  Date of last refill: 12/29/23  Pharmacy Name:     Last Lipid Panel:    Lab Results   Component Value Date/Time    CHOL 142 01/17/2024 10:26 AM    TRIG 78 01/17/2024 10:26 AM    HDL 46 01/17/2024 10:26 AM     Last CMP:   Lab Results   Component Value Date     01/17/2024    K 4.1 01/17/2024     01/17/2024    CO2 24 01/17/2024    BUN 14 01/17/2024    CREATININE 0.7 01/17/2024    GLUCOSE 84 01/17/2024    CALCIUM 10.0 01/17/2024    BILITOT 0.6 01/17/2024    ALKPHOS 115 01/17/2024    AST 22 01/17/2024    ALT 25 01/17/2024       Last Thyroid:    Lab Results   Component Value Date    TSH 1.570 01/17/2024    T4FREE 1.20 01/17/2024     Last Hemoglobin A1C:    Lab Results   Component Value Date/Time    LABA1C 5.0 01/17/2024 10:25 AM       Rx verified, ordered and set to EP.

## 2024-05-09 RX ORDER — OMEPRAZOLE 40 MG/1
CAPSULE, DELAYED RELEASE ORAL
Qty: 90 CAPSULE | Refills: 3 | Status: SHIPPED | OUTPATIENT
Start: 2024-05-09

## 2024-05-09 NOTE — TELEPHONE ENCOUNTER
This medication refill is regarding a electronic request. Refill requested by  pharmacy .    Requested Prescriptions     Pending Prescriptions Disp Refills    omeprazole (PRILOSEC) 40 MG delayed release capsule [Pharmacy Med Name: Omeprazole 40 MG Oral Capsule Delayed Release] 90 capsule 3     Sig: TAKE 1 CAPSULE BY MOUTH DAILY IN THE MORNING BEFORE BREAKFAST       Date of last visit: 1/22/2024   Date of next visit: 7/22/2024  Date of last refill: 3/23/23  Pharmacy Name:     Last Lipid Panel:    Lab Results   Component Value Date/Time    CHOL 142 01/17/2024 10:26 AM    TRIG 78 01/17/2024 10:26 AM    HDL 46 01/17/2024 10:26 AM     Last CMP:   Lab Results   Component Value Date     01/17/2024    K 4.1 01/17/2024     01/17/2024    CO2 24 01/17/2024    BUN 14 01/17/2024    CREATININE 0.7 01/17/2024    GLUCOSE 84 01/17/2024    CALCIUM 10.0 01/17/2024    BILITOT 0.6 01/17/2024    ALKPHOS 115 01/17/2024    AST 22 01/17/2024    ALT 25 01/17/2024    LABGLOM >60 01/17/2024       Last Thyroid:    Lab Results   Component Value Date    TSH 1.570 01/17/2024    T4FREE 1.20 01/17/2024     Last Hemoglobin A1C:    Lab Results   Component Value Date/Time    LABA1C 5.0 01/17/2024 10:25 AM       Rx verified, ordered and set to EP.

## 2024-07-17 ENCOUNTER — PATIENT MESSAGE (OUTPATIENT)
Dept: FAMILY MEDICINE CLINIC | Age: 49
End: 2024-07-17

## 2024-07-17 NOTE — TELEPHONE ENCOUNTER
From: Elizabeth Veronica  To: Bela Snow  Sent: 7/17/2024 12:23 PM EDT  Subject: Blood tests    Do u just have the 2 tests that need done for appt next week? Do I need to fast?

## 2024-07-18 ENCOUNTER — HOSPITAL ENCOUNTER (OUTPATIENT)
Age: 49
Discharge: HOME | End: 2024-07-18
Payer: COMMERCIAL

## 2024-07-18 DIAGNOSIS — E11.9 TYPE 2 DIABETES MELLITUS WITHOUT COMPLICATION, WITHOUT LONG-TERM CURRENT USE OF INSULIN (HCC): ICD-10-CM

## 2024-07-18 LAB
CREAT UR-MCNC: 15.5 MG/DL
MICROALBUMIN UR-MCNC: < 1.2 MG/DL
MICROALBUMIN/CREAT RATIO PNL UR: 77 MG/G (ref 0–30)

## 2024-07-18 PROCEDURE — 36415 COLL VENOUS BLD VENIPUNCTURE: CPT

## 2024-07-18 PROCEDURE — 82043 UR ALBUMIN QUANTITATIVE: CPT

## 2024-07-18 PROCEDURE — 83036 HEMOGLOBIN GLYCOSYLATED A1C: CPT

## 2024-07-19 LAB
DEPRECATED MEAN GLUCOSE BLD GHB EST-ACNC: 87 MG/DL (ref 70–126)
HBA1C MFR BLD HPLC: 4.9 % (ref 4.4–6.4)

## 2024-07-22 ENCOUNTER — OFFICE VISIT (OUTPATIENT)
Dept: FAMILY MEDICINE CLINIC | Age: 49
End: 2024-07-22
Payer: COMMERCIAL

## 2024-07-22 VITALS
DIASTOLIC BLOOD PRESSURE: 74 MMHG | SYSTOLIC BLOOD PRESSURE: 106 MMHG | OXYGEN SATURATION: 99 % | BODY MASS INDEX: 32.49 KG/M2 | HEART RATE: 80 BPM | RESPIRATION RATE: 16 BRPM | HEIGHT: 68 IN | WEIGHT: 214.4 LBS

## 2024-07-22 DIAGNOSIS — E78.2 ELEVATED TRIGLYCERIDES WITH HIGH CHOLESTEROL: ICD-10-CM

## 2024-07-22 DIAGNOSIS — R80.9 MICROALBUMINURIA: ICD-10-CM

## 2024-07-22 DIAGNOSIS — E11.9 TYPE 2 DIABETES MELLITUS WITHOUT COMPLICATION, WITHOUT LONG-TERM CURRENT USE OF INSULIN (HCC): Primary | ICD-10-CM

## 2024-07-22 DIAGNOSIS — Z13.220 SCREENING, LIPID: ICD-10-CM

## 2024-07-22 PROCEDURE — 99214 OFFICE O/P EST MOD 30 MIN: CPT | Performed by: NURSE PRACTITIONER

## 2024-07-22 PROCEDURE — 3044F HG A1C LEVEL LT 7.0%: CPT | Performed by: NURSE PRACTITIONER

## 2024-07-22 RX ORDER — OMEPRAZOLE 40 MG/1
CAPSULE, DELAYED RELEASE ORAL
Qty: 90 CAPSULE | Refills: 3 | Status: SHIPPED | OUTPATIENT
Start: 2024-07-22

## 2024-07-22 RX ORDER — ATORVASTATIN CALCIUM 10 MG/1
10 TABLET, FILM COATED ORAL DAILY
Qty: 90 TABLET | Refills: 3 | Status: SHIPPED | OUTPATIENT
Start: 2024-07-22

## 2024-07-22 RX ORDER — MELOXICAM 15 MG/1
TABLET ORAL
Qty: 90 TABLET | Refills: 3 | Status: SHIPPED | OUTPATIENT
Start: 2024-07-22

## 2024-07-22 RX ORDER — ESCITALOPRAM OXALATE 20 MG/1
20 TABLET ORAL DAILY
Qty: 90 TABLET | Refills: 3 | Status: SHIPPED | OUTPATIENT
Start: 2024-07-22

## 2024-07-22 RX ORDER — LISINOPRIL 5 MG/1
2.5 TABLET ORAL DAILY
Qty: 45 TABLET | Refills: 3 | Status: SHIPPED | OUTPATIENT
Start: 2024-07-22

## 2024-07-22 RX ORDER — CALCIUM POLYCARBOPHIL 625 MG
TABLET ORAL
Qty: 360 TABLET | Refills: 3 | COMMUNITY
Start: 2024-07-22

## 2024-07-22 RX ORDER — MONTELUKAST SODIUM 10 MG/1
10 TABLET ORAL NIGHTLY
Qty: 90 TABLET | Refills: 3 | Status: SHIPPED | OUTPATIENT
Start: 2024-07-22

## 2024-07-22 RX ORDER — LORATADINE 10 MG/1
TABLET ORAL
Qty: 90 TABLET | Refills: 3 | Status: SHIPPED | OUTPATIENT
Start: 2024-07-22

## 2024-07-22 RX ORDER — CETIRIZINE HYDROCHLORIDE 10 MG/1
10 TABLET ORAL DAILY
Qty: 90 TABLET | Refills: 3 | Status: SHIPPED | OUTPATIENT
Start: 2024-07-22

## 2024-07-22 RX ORDER — TIRZEPATIDE 15 MG/.5ML
INJECTION, SOLUTION SUBCUTANEOUS
Qty: 6 ML | Refills: 3 | Status: SHIPPED | OUTPATIENT
Start: 2024-07-22

## 2024-07-22 ASSESSMENT — ENCOUNTER SYMPTOMS
SHORTNESS OF BREATH: 0
BLOOD IN STOOL: 0
NAUSEA: 0
CONSTIPATION: 0
VOMITING: 0
DIARRHEA: 0

## 2024-07-22 NOTE — PROGRESS NOTES
Chief Complaint   Patient presents with    6 Month Follow-Up    Medication Refill    Discuss Medications     Spirolactone       SUBJECTIVE     Elizabeth Veronica is a 49 y.o.female      Pt presents for follow up of type 2 diabetes.    Continues to do well with weight loss. Drinks a lot of protein shakes and consumes lots of protein during day. Likes Diet Pepsi.  No longer working from home. 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 Mentally she is doing well and enjoys being around others.    Pt is not monitoring home BS.   Following with Dr Roberson's office for hair loss.    Review of Systems   Constitutional:  Negative for chills, diaphoresis and fever.   Respiratory:  Negative for shortness of breath.    Cardiovascular:  Negative for chest pain, palpitations and leg swelling.   Gastrointestinal:  Negative for blood in stool, constipation, diarrhea, nausea and vomiting.   Genitourinary:  Negative for dysuria and hematuria.   Musculoskeletal:  Negative for myalgias.   Neurological:  Negative for dizziness and headaches.   All other systems reviewed and are negative.      OBJECTIVE     /74 (Site: Left Upper Arm, Position: Sitting, Cuff Size: Large Adult)   Pulse 80   Resp 16   Ht 1.727 m (5' 8\")   Wt 97.3 kg (214 lb 6.4 oz)   SpO2 99% Comment: Room Air  BMI 32.60 kg/m²   Wt Readings from Last 3 Encounters:   07/22/24 97.3 kg (214 lb 6.4 oz)   01/22/24 110.8 kg (244 lb 6 oz)   12/26/23 115.8 kg (255 lb 6.4 oz)       Physical Exam  Vitals and nursing note reviewed.   Constitutional:       Appearance: She is well-developed. She is obese.   HENT:      Head: Normocephalic and atraumatic.      Right Ear: External ear normal.      Left Ear: External ear normal.      Nose: Nose normal.   Eyes:      Conjunctiva/sclera: Conjunctivae normal.      Pupils: Pupils are equal, round, and reactive to light.   Cardiovascular:      Rate and Rhythm: Normal rate and regular rhythm.      Heart sounds: Normal heart

## 2024-07-26 NOTE — PATIENT INSTRUCTIONS
[employer]*  (Please call [address]/Dannie if need more resources)      Medical  Alzheimer’s Association: [phone]  American Cancer Society: [phone]  American Heart Association: [phone]  American Lung Association: [phone]  Arthritis Foundation: [phone]  [place] [employer] for [employer]ually Impaired: [phone]  Kidney Foundation: [phone]    [employer] [facility]:  What they offer: Assist in finding resources to help pay hospital bills.  Phone Number: 1-818.456.2119  Website: https://www[employer]inuity/patient-resources/financial-assistance    [facility] [employer]:  What they offer: Medical coverage assistance for children, pregnant women, elderly, individuals with disabilities and those looking for long term care and/or in home waiver care.   Phone Number:  [phone]  Website: https://[employer]/services/medical-assistance    [facility] [employer]:  What they offer: Aging and disability resource center, care management/coordination, transportation, wellness and prevention.  Phone Number: [phone]        Utility  [facility] [employer]:  What they do: Help pay rent, utilities & internet bills.  Phone Number: 812.462.2266  Website: https://[employer]/emergency-services/rent-assistance      [facility]:  What they do: They offer Utility assistance  Phone Number: 824.272.2092   Website: https://[facility].org/[facility]/lima/equip-families    [employer]:  What they offer: Temporary cash assistance to families to pay immediate needs while the adults of the families prepare and search for jobs.   Phone Number: [phone]  Website: https://[employer]/services/cash-assistance

## 2024-08-11 DIAGNOSIS — E11.9 TYPE 2 DIABETES MELLITUS WITHOUT COMPLICATION, WITHOUT LONG-TERM CURRENT USE OF INSULIN (HCC): Primary | ICD-10-CM

## 2024-08-12 RX ORDER — TIRZEPATIDE 2.5 MG/.5ML
INJECTION, SOLUTION SUBCUTANEOUS
Qty: 2 ML | Refills: 0 | OUTPATIENT
Start: 2024-08-12

## 2024-08-12 NOTE — TELEPHONE ENCOUNTER
This medication refill is regarding a MyChart request. Refill requested by patient.    Requested Prescriptions     Pending Prescriptions Disp Refills    MOUNJARO 2.5 MG/0.5ML SOPN SC injection [Pharmacy Med Name: MOUNJARO 2.5 MG/0.5 ML PEN] 2 mL 0     Sig: inject 0.5 milliliters subcutaneously every week       Date of last visit: 7/22/2024   Date of next visit: 1/22/2025  Date of last refill: 7-22-24    Rx verified, ordered and set to EP.

## 2024-11-12 ENCOUNTER — HOSPITAL ENCOUNTER (OUTPATIENT)
Dept: WOMENS IMAGING | Age: 49
Discharge: HOME | End: 2024-11-12
Payer: COMMERCIAL

## 2024-11-12 DIAGNOSIS — Z12.31 VISIT FOR SCREENING MAMMOGRAM: ICD-10-CM

## 2024-11-12 PROCEDURE — 77063 BREAST TOMOSYNTHESIS BI: CPT

## 2024-11-14 ENCOUNTER — LAB (OUTPATIENT)
Dept: LAB | Age: 49
End: 2024-11-14

## 2024-11-14 ENCOUNTER — HOSPITAL ENCOUNTER (OUTPATIENT)
Dept: WOMENS IMAGING | Age: 49
Discharge: HOME | End: 2024-11-14
Attending: RADIOLOGY
Payer: COMMERCIAL

## 2024-11-14 ENCOUNTER — TELEPHONE (OUTPATIENT)
Dept: FAMILY MEDICINE CLINIC | Age: 49
End: 2024-11-14

## 2024-11-14 DIAGNOSIS — R92.30 BREAST DENSITY: Primary | ICD-10-CM

## 2024-11-14 DIAGNOSIS — R92.8 ABNORMAL MAMMOGRAM: ICD-10-CM

## 2024-11-14 DIAGNOSIS — E11.9 TYPE 2 DIABETES MELLITUS WITHOUT COMPLICATION, WITHOUT LONG-TERM CURRENT USE OF INSULIN (HCC): Primary | ICD-10-CM

## 2024-11-14 DIAGNOSIS — R80.9 MICROALBUMINURIA: ICD-10-CM

## 2024-11-14 DIAGNOSIS — Z09 FOLLOW-UP EXAM: ICD-10-CM

## 2024-11-14 DIAGNOSIS — E11.9 TYPE 2 DIABETES MELLITUS WITHOUT COMPLICATION, WITHOUT LONG-TERM CURRENT USE OF INSULIN (HCC): ICD-10-CM

## 2024-11-14 LAB
ANION GAP SERPL CALC-SCNC: 10 MEQ/L (ref 8–16)
BUN SERPL-MCNC: 18 MG/DL (ref 7–22)
CALCIUM SERPL-MCNC: 9.5 MG/DL (ref 8.5–10.5)
CHLORIDE SERPL-SCNC: 105 MEQ/L (ref 98–111)
CO2 SERPL-SCNC: 25 MEQ/L (ref 23–33)
CREAT SERPL-MCNC: 0.7 MG/DL (ref 0.4–1.2)
GFR SERPL CREATININE-BSD FRML MDRD: > 90 ML/MIN/1.73M2
GLUCOSE SERPL-MCNC: 84 MG/DL (ref 70–108)
POTASSIUM SERPL-SCNC: 4.8 MEQ/L (ref 3.5–5.2)
SODIUM SERPL-SCNC: 140 MEQ/L (ref 135–145)

## 2024-11-14 PROCEDURE — 76642 ULTRASOUND BREAST LIMITED: CPT

## 2024-11-14 PROCEDURE — G0279 TOMOSYNTHESIS, MAMMO: HCPCS

## 2024-11-14 NOTE — TELEPHONE ENCOUNTER
Received a call from [facility] stating the pt is there to have a BMP drawn and they can't release the order because the expected date is 7/2025 and the order has to be within 30 days of the draw date to be able to use it. Order updated.

## 2024-11-15 ENCOUNTER — TELEPHONE (OUTPATIENT)
Dept: FAMILY MEDICINE CLINIC | Age: 49
End: 2024-11-15

## 2025-01-13 ENCOUNTER — PATIENT MESSAGE (OUTPATIENT)
Dept: FAMILY MEDICINE CLINIC | Age: 50
End: 2025-01-13

## 2025-01-17 ENCOUNTER — LAB (OUTPATIENT)
Dept: LAB | Age: 50
End: 2025-01-17

## 2025-01-17 DIAGNOSIS — R80.9 MICROALBUMINURIA: ICD-10-CM

## 2025-01-17 DIAGNOSIS — E11.9 TYPE 2 DIABETES MELLITUS WITHOUT COMPLICATION, WITHOUT LONG-TERM CURRENT USE OF INSULIN (HCC): ICD-10-CM

## 2025-01-17 DIAGNOSIS — Z13.220 SCREENING, LIPID: ICD-10-CM

## 2025-01-17 LAB
ALBUMIN SERPL BCG-MCNC: 4.1 G/DL (ref 3.5–5.1)
ALP SERPL-CCNC: 87 U/L (ref 38–126)
ALT SERPL W/O P-5'-P-CCNC: 13 U/L (ref 11–66)
ANION GAP SERPL CALC-SCNC: 10 MEQ/L (ref 8–16)
AST SERPL-CCNC: 15 U/L (ref 5–40)
BASOPHILS ABSOLUTE: 0.1 THOU/MM3 (ref 0–0.1)
BASOPHILS NFR BLD AUTO: 0.9 %
BILIRUB SERPL-MCNC: 0.5 MG/DL (ref 0.3–1.2)
BUN SERPL-MCNC: 15 MG/DL (ref 7–22)
CALCIUM SERPL-MCNC: 9.2 MG/DL (ref 8.5–10.5)
CHLORIDE SERPL-SCNC: 103 MEQ/L (ref 98–111)
CHOLEST SERPL-MCNC: 136 MG/DL (ref 100–199)
CO2 SERPL-SCNC: 26 MEQ/L (ref 23–33)
CREAT SERPL-MCNC: 0.6 MG/DL (ref 0.4–1.2)
CREAT UR-MCNC: 150.3 MG/DL
DEPRECATED MEAN GLUCOSE BLD GHB EST-ACNC: 84 MG/DL (ref 70–126)
DEPRECATED RDW RBC AUTO: 43.5 FL (ref 35–45)
EOSINOPHIL NFR BLD AUTO: 3.2 %
EOSINOPHILS ABSOLUTE: 0.2 THOU/MM3 (ref 0–0.4)
ERYTHROCYTE [DISTWIDTH] IN BLOOD BY AUTOMATED COUNT: 13.2 % (ref 11.5–14.5)
GFR SERPL CREATININE-BSD FRML MDRD: > 90 ML/MIN/1.73M2
GLUCOSE SERPL-MCNC: 78 MG/DL (ref 70–108)
HBA1C MFR BLD HPLC: 4.8 % (ref 4.4–6.4)
HCT VFR BLD AUTO: 40.9 % (ref 37–47)
HDLC SERPL-MCNC: 57 MG/DL
HGB BLD-MCNC: 13.6 GM/DL (ref 12–16)
IMM GRANULOCYTES # BLD AUTO: 0.01 THOU/MM3 (ref 0–0.07)
IMM GRANULOCYTES NFR BLD AUTO: 0.2 %
LDLC SERPL CALC-MCNC: 71 MG/DL
LYMPHOCYTES ABSOLUTE: 1.6 THOU/MM3 (ref 1–4.8)
LYMPHOCYTES NFR BLD AUTO: 27.9 %
MCH RBC QN AUTO: 30 PG (ref 26–33)
MCHC RBC AUTO-ENTMCNC: 33.3 GM/DL (ref 32.2–35.5)
MCV RBC AUTO: 90.1 FL (ref 81–99)
MICROALBUMIN UR-MCNC: < 1.2 MG/DL
MICROALBUMIN/CREAT RATIO PNL UR: 8 MG/G (ref 0–30)
MONOCYTES ABSOLUTE: 0.4 THOU/MM3 (ref 0.4–1.3)
MONOCYTES NFR BLD AUTO: 8 %
NEUTROPHILS ABSOLUTE: 3.3 THOU/MM3 (ref 1.8–7.7)
NEUTROPHILS NFR BLD AUTO: 59.8 %
NRBC BLD AUTO-RTO: 0 /100 WBC
PLATELET # BLD AUTO: 238 THOU/MM3 (ref 130–400)
PMV BLD AUTO: 10.4 FL (ref 9.4–12.4)
POTASSIUM SERPL-SCNC: 4.4 MEQ/L (ref 3.5–5.2)
PROT SERPL-MCNC: 6.3 G/DL (ref 6.1–8)
RBC # BLD AUTO: 4.54 MILL/MM3 (ref 4.2–5.4)
SODIUM SERPL-SCNC: 139 MEQ/L (ref 135–145)
T4 FREE SERPL-MCNC: 1 NG/DL (ref 0.93–1.68)
TRIGL SERPL-MCNC: 38 MG/DL (ref 0–199)
TSH SERPL DL<=0.005 MIU/L-ACNC: 1.46 UIU/ML (ref 0.4–4.2)
WBC # BLD AUTO: 5.6 THOU/MM3 (ref 4.8–10.8)

## 2025-01-17 ASSESSMENT — PATIENT HEALTH QUESTIONNAIRE - PHQ9
SUM OF ALL RESPONSES TO PHQ QUESTIONS 1-9: 0
2. FEELING DOWN, DEPRESSED OR HOPELESS: NOT AT ALL
2. FEELING DOWN, DEPRESSED OR HOPELESS: NOT AT ALL
1. LITTLE INTEREST OR PLEASURE IN DOING THINGS: NOT AT ALL
SUM OF ALL RESPONSES TO PHQ QUESTIONS 1-9: 0
SUM OF ALL RESPONSES TO PHQ9 QUESTIONS 1 & 2: 0
SUM OF ALL RESPONSES TO PHQ QUESTIONS 1-9: 0
SUM OF ALL RESPONSES TO PHQ QUESTIONS 1-9: 0
1. LITTLE INTEREST OR PLEASURE IN DOING THINGS: NOT AT ALL
SUM OF ALL RESPONSES TO PHQ9 QUESTIONS 1 & 2: 0

## 2025-01-22 ENCOUNTER — OFFICE VISIT (OUTPATIENT)
Dept: FAMILY MEDICINE CLINIC | Age: 50
End: 2025-01-22

## 2025-01-22 VITALS
HEART RATE: 80 BPM | TEMPERATURE: 98.1 F | SYSTOLIC BLOOD PRESSURE: 100 MMHG | WEIGHT: 208.6 LBS | BODY MASS INDEX: 31.72 KG/M2 | RESPIRATION RATE: 16 BRPM | DIASTOLIC BLOOD PRESSURE: 64 MMHG

## 2025-01-22 DIAGNOSIS — R80.9 MICROALBUMINURIA: ICD-10-CM

## 2025-01-22 DIAGNOSIS — R21 RASH OF NECK: ICD-10-CM

## 2025-01-22 DIAGNOSIS — E78.2 ELEVATED TRIGLYCERIDES WITH HIGH CHOLESTEROL: ICD-10-CM

## 2025-01-22 DIAGNOSIS — E11.9 TYPE 2 DIABETES MELLITUS WITHOUT COMPLICATION, WITHOUT LONG-TERM CURRENT USE OF INSULIN (HCC): Primary | ICD-10-CM

## 2025-01-22 DIAGNOSIS — L65.9 HAIR LOSS: ICD-10-CM

## 2025-01-22 DIAGNOSIS — M54.50 LUMBAR BACK PAIN: ICD-10-CM

## 2025-01-22 RX ORDER — ATORVASTATIN CALCIUM 10 MG/1
10 TABLET, FILM COATED ORAL DAILY
Qty: 90 TABLET | Refills: 3 | Status: SHIPPED | OUTPATIENT
Start: 2025-01-22

## 2025-01-22 RX ORDER — OMEPRAZOLE 40 MG/1
CAPSULE, DELAYED RELEASE ORAL
Qty: 90 CAPSULE | Refills: 3 | Status: SHIPPED | OUTPATIENT
Start: 2025-01-22

## 2025-01-22 RX ORDER — ESCITALOPRAM OXALATE 20 MG/1
20 TABLET ORAL DAILY
Qty: 90 TABLET | Refills: 3 | Status: SHIPPED | OUTPATIENT
Start: 2025-01-22

## 2025-01-22 RX ORDER — LISINOPRIL 5 MG/1
2.5 TABLET ORAL DAILY
Qty: 45 TABLET | Refills: 3 | Status: SHIPPED | OUTPATIENT
Start: 2025-01-22

## 2025-01-22 RX ORDER — MONTELUKAST SODIUM 10 MG/1
10 TABLET ORAL NIGHTLY
Qty: 90 TABLET | Refills: 3 | Status: SHIPPED | OUTPATIENT
Start: 2025-01-22

## 2025-01-22 RX ORDER — MELOXICAM 15 MG/1
TABLET ORAL
Qty: 90 TABLET | Refills: 3 | Status: SHIPPED | OUTPATIENT
Start: 2025-01-22

## 2025-01-22 RX ORDER — METHYLPREDNISOLONE ACETATE 80 MG/ML
80 INJECTION, SUSPENSION INTRA-ARTICULAR; INTRALESIONAL; INTRAMUSCULAR; SOFT TISSUE ONCE
Status: COMPLETED | OUTPATIENT
Start: 2025-01-22 | End: 2025-01-22

## 2025-01-22 RX ADMIN — METHYLPREDNISOLONE ACETATE 80 MG: 80 INJECTION, SUSPENSION INTRA-ARTICULAR; INTRALESIONAL; INTRAMUSCULAR; SOFT TISSUE at 15:44

## 2025-01-22 SDOH — ECONOMIC STABILITY: FOOD INSECURITY: WITHIN THE PAST 12 MONTHS, YOU WORRIED THAT YOUR FOOD WOULD RUN OUT BEFORE YOU GOT MONEY TO BUY MORE.: NEVER TRUE

## 2025-01-22 SDOH — ECONOMIC STABILITY: FOOD INSECURITY: WITHIN THE PAST 12 MONTHS, THE FOOD YOU BOUGHT JUST DIDN'T LAST AND YOU DIDN'T HAVE MONEY TO GET MORE.: NEVER TRUE

## 2025-01-22 NOTE — PROGRESS NOTES
After obtaining consent, and per orders of TS. , injection of depo  given in Right vastus lateralis by Mandeep Umanzor CMA. Patient tolerated

## 2025-01-22 NOTE — PROGRESS NOTES
Chief Complaint   Patient presents with   • Follow-up     Pt here for 6mo f/u. Labs done        SUBJECTIVE     Elizabeth Veronica is a 49 y.o.female      Pt presents for follow up of type 2 diabetes.    Went to dermatology - Natalie at Dr Roberson's office. They recommended Nutrafol and Rogaine. She has been taking this for 6-8 months. Her hair is thicker per  but she is losing clumps again.    Some back pain. Questions if the lose skin on her abd could be affecting this. Pt has lost around 200 lbs.   Has cold hands and cold feet.   Chest/neck have been getting red and irritated. She questions if the pillows in [place] had feathers in them.  Pt is not monitoring home BS.   Following with Dr Roberson's office for hair loss.    Review of Systems   Constitutional:  Positive for fatigue. Negative for chills, diaphoresis and fever.   Respiratory:  Negative for shortness of breath.    Cardiovascular:  Negative for chest pain, palpitations and leg swelling.   Gastrointestinal:  Negative for blood in stool, constipation, diarrhea, nausea and vomiting.   Genitourinary:  Negative for dysuria and hematuria.   Musculoskeletal:  Positive for back pain. Negative for myalgias.   Skin:  Positive for rash (chest).   Neurological:  Negative for dizziness and headaches.   All other systems reviewed and are negative.      OBJECTIVE     /64 (Site: Left Upper Arm, Position: Sitting)   Pulse 80   Temp 98.1 °F (36.7 °C)   Resp 16   Wt 94.6 kg (208 lb 9.6 oz)   BMI 31.72 kg/m²   Wt Readings from Last 3 Encounters:   01/22/25 94.6 kg (208 lb 9.6 oz)   07/22/24 97.3 kg (214 lb 6.4 oz)   01/22/24 110.8 kg (244 lb 6 oz)   Has continued to lose weight while on Mounjaro.    Physical Exam  Vitals and nursing note reviewed.   Constitutional:       Appearance: She is well-developed. She is obese.   HENT:      Head: Normocephalic and atraumatic.      Right Ear: External ear normal.      Left Ear: External ear normal.      Nose: Nose

## 2025-01-28 ASSESSMENT — ENCOUNTER SYMPTOMS: BACK PAIN: 1

## 2025-02-01 ENCOUNTER — LAB (OUTPATIENT)
Dept: LAB | Age: 50
End: 2025-02-01

## 2025-02-01 DIAGNOSIS — L65.9 HAIR LOSS: ICD-10-CM

## 2025-02-01 LAB
FERRITIN SERPL IA-MCNC: 99 NG/ML (ref 10–291)
FOLATE SERPL-MCNC: 14.5 NG/ML (ref 4.8–24.2)
IRON SERPL-MCNC: 101 UG/DL (ref 50–170)
TIBC SERPL-MCNC: 282 UG/DL (ref 171–450)
VIT B12 SERPL-MCNC: 824 PG/ML (ref 211–911)

## 2025-02-05 ENCOUNTER — LAB (OUTPATIENT)
Dept: LAB | Age: 50
End: 2025-02-05

## 2025-02-05 LAB — 25(OH)D3 SERPL-MCNC: 49 NG/ML (ref 30–100)

## 2025-02-08 LAB — ZINC SERPL-MCNC: 74.2 UG/DL (ref 60–120)

## 2025-04-17 DIAGNOSIS — E11.9 TYPE 2 DIABETES MELLITUS WITHOUT COMPLICATION, WITHOUT LONG-TERM CURRENT USE OF INSULIN: ICD-10-CM

## 2025-04-18 DIAGNOSIS — R80.9 MICROALBUMINURIA: ICD-10-CM

## 2025-04-18 DIAGNOSIS — E11.9 TYPE 2 DIABETES MELLITUS WITHOUT COMPLICATION, WITHOUT LONG-TERM CURRENT USE OF INSULIN: ICD-10-CM

## 2025-04-18 RX ORDER — TIRZEPATIDE 12.5 MG/.5ML
INJECTION, SOLUTION SUBCUTANEOUS
Qty: 2 ML | Refills: 2 | Status: SHIPPED | OUTPATIENT
Start: 2025-04-18

## 2025-04-18 RX ORDER — LORATADINE 10 MG/1
TABLET ORAL
Qty: 90 TABLET | Refills: 3 | Status: SHIPPED | OUTPATIENT
Start: 2025-04-18

## 2025-04-18 RX ORDER — LISINOPRIL 5 MG/1
2.5 TABLET ORAL DAILY
Qty: 45 TABLET | Refills: 3 | Status: SHIPPED | OUTPATIENT
Start: 2025-04-18

## 2025-04-18 NOTE — TELEPHONE ENCOUNTER
This medication refill is regarding a MyChart request. Refill requested by patient.    Requested Prescriptions     Pending Prescriptions Disp Refills    loratadine (CLARITIN) 10 MG tablet 90 tablet 3     Sig: TAKE 1 TABLET BY MOUTH EVERY DAY    lisinopril (PRINIVIL;ZESTRIL) 5 MG tablet 45 tablet 3     Sig: Take 0.5 tablets by mouth daily     Date of last visit: 1/22/2025   Date of next visit: 4/24/2025  Date of last refill:    -Lisinopril 1/22/25 #45/3   -Claritin 7/22/24 #90/3  Pharmacy Name: [facility]    Last CMP:   Lab Results   Component Value Date     01/17/2025    K 4.4 01/17/2025     01/17/2025    CO2 26 01/17/2025    BUN 15 01/17/2025    CREATININE 0.6 01/17/2025    GLUCOSE 78 01/17/2025    CALCIUM 9.2 01/17/2025    BILITOT 0.5 01/17/2025    ALKPHOS 87 01/17/2025    AST 15 01/17/2025    ALT 13 01/17/2025    LABGLOM > 90 01/17/2025     Rx verified, ordered and set to EP.

## 2025-04-18 NOTE — TELEPHONE ENCOUNTER
This medication refill is regarding a electronic request. Refill requested by patient.    Requested Prescriptions     Pending Prescriptions Disp Refills    MOUNJARO 12.5 MG/0.5ML SOAJ [Pharmacy Med Name: MOUNJARO 12.5 MG/0.5 ML PEN]  2     Sig: INJECT 12.5 MG INTO THE SKIN EVERY 7 DAYS     Date of last visit: 1/22/2025   Date of next visit: 4/24/2025  Date of last refill: 1/22/25  Pharmacy Name: [facility]/pharmacy #[id number] - [place], [place] - [address] [facility] [phone] - F [phone]     Last Lipid Panel:    Lab Results   Component Value Date/Time    CHOL 136 01/17/2025 08:15 AM    TRIG 38 01/17/2025 08:15 AM    HDL 57 01/17/2025 08:15 AM     Last CMP:   Lab Results   Component Value Date     01/17/2025    K 4.4 01/17/2025     01/17/2025    CO2 26 01/17/2025    BUN 15 01/17/2025    CREATININE 0.6 01/17/2025    GLUCOSE 78 01/17/2025    CALCIUM 9.2 01/17/2025    BILITOT 0.5 01/17/2025    ALKPHOS 87 01/17/2025    AST 15 01/17/2025    ALT 13 01/17/2025    LABGLOM > 90 01/17/2025       Last Thyroid:    Lab Results   Component Value Date    TSH 1.460 01/17/2025    T4FREE 1.00 01/17/2025     Last Hemoglobin A1C:    Lab Results   Component Value Date/Time    LABA1C 4.8 01/17/2025 08:15 AM       Rx verified, ordered and set to EP.

## 2025-04-24 ENCOUNTER — OFFICE VISIT (OUTPATIENT)
Dept: FAMILY MEDICINE CLINIC | Age: 50
End: 2025-04-24
Payer: COMMERCIAL

## 2025-04-24 VITALS
HEART RATE: 80 BPM | WEIGHT: 203 LBS | TEMPERATURE: 97.7 F | DIASTOLIC BLOOD PRESSURE: 74 MMHG | HEIGHT: 68 IN | SYSTOLIC BLOOD PRESSURE: 122 MMHG | BODY MASS INDEX: 30.77 KG/M2 | RESPIRATION RATE: 16 BRPM

## 2025-04-24 DIAGNOSIS — E11.9 TYPE 2 DIABETES MELLITUS WITHOUT COMPLICATION, WITHOUT LONG-TERM CURRENT USE OF INSULIN (HCC): ICD-10-CM

## 2025-04-24 DIAGNOSIS — E78.2 ELEVATED TRIGLYCERIDES WITH HIGH CHOLESTEROL: ICD-10-CM

## 2025-04-24 DIAGNOSIS — L65.9 HAIR LOSS: ICD-10-CM

## 2025-04-24 DIAGNOSIS — Z00.00 WELL ADULT EXAM: Primary | ICD-10-CM

## 2025-04-24 DIAGNOSIS — R80.9 MICROALBUMINURIA: ICD-10-CM

## 2025-04-24 DIAGNOSIS — E66.01 MORBID OBESITY (HCC): ICD-10-CM

## 2025-04-24 LAB — HBA1C MFR BLD: 4.8 %

## 2025-04-24 PROCEDURE — 99396 PREV VISIT EST AGE 40-64: CPT | Performed by: NURSE PRACTITIONER

## 2025-04-24 PROCEDURE — 83036 HEMOGLOBIN GLYCOSYLATED A1C: CPT | Performed by: NURSE PRACTITIONER

## 2025-04-24 RX ORDER — OMEPRAZOLE 40 MG/1
CAPSULE, DELAYED RELEASE ORAL
Qty: 90 CAPSULE | Refills: 3 | Status: SHIPPED | OUTPATIENT
Start: 2025-04-24

## 2025-04-24 RX ORDER — ATORVASTATIN CALCIUM 10 MG/1
10 TABLET, FILM COATED ORAL DAILY
Qty: 90 TABLET | Refills: 3 | Status: SHIPPED | OUTPATIENT
Start: 2025-04-24

## 2025-04-24 RX ORDER — MELOXICAM 15 MG/1
TABLET ORAL
Qty: 90 TABLET | Refills: 3 | Status: SHIPPED | OUTPATIENT
Start: 2025-04-24

## 2025-04-24 RX ORDER — MINOXIDIL 2.5 MG/1
2.5 TABLET ORAL DAILY
COMMUNITY

## 2025-04-24 RX ORDER — LISINOPRIL 5 MG/1
2.5 TABLET ORAL DAILY
Qty: 45 TABLET | Refills: 3 | Status: SHIPPED | OUTPATIENT
Start: 2025-04-24

## 2025-04-24 RX ORDER — MONTELUKAST SODIUM 10 MG/1
10 TABLET ORAL NIGHTLY
Qty: 90 TABLET | Refills: 3 | Status: SHIPPED | OUTPATIENT
Start: 2025-04-24

## 2025-04-24 RX ORDER — FINASTERIDE 5 MG/1
5 TABLET, FILM COATED ORAL DAILY
COMMUNITY

## 2025-04-24 ASSESSMENT — ENCOUNTER SYMPTOMS
SHORTNESS OF BREATH: 0
VOMITING: 0
BLOOD IN STOOL: 0
DIARRHEA: 0
CONSTIPATION: 0
NAUSEA: 0

## 2025-04-24 NOTE — PROGRESS NOTES
Chief Complaint   Patient presents with    Annual Exam       SUBJECTIVE     Elizabeth Veronica is a 49 y.o.female      Pt presents for Annual Wellness visit.     History of Present Illness  The patient is a 49-year-old female who presents for an annual wellness exam.    Mounjaro was decreased at [facility] appt. Pt is tolerating well and continues to lose weight. Weight is monitored biweekly, and a diet is maintained that includes no more than two protein shakes daily. Despite an increase in regular food intake compared to the previous year, weight has remained stable. Regular physical activity is engaged in, and no chest pain or shortness of breath is reported. Occasional dizziness is experienced but does not last long. No urinary or bowel issues are reported, and FiberCon is taken for constipation management. Blood pressure is not monitored at home.    Hair loss is being managed under the care of Dr. Reyes, who prescribed minoxidil and finasteride, resulting in noticeable improvement. Additional medications include spironolactone, minocycline, and clindamycin.    Mounjaro dosage was reduced from 15 to 12.5 due to weight loss and hair loss.    Review of Systems   Constitutional:  Negative for chills, diaphoresis and fever.   Respiratory:  Negative for shortness of breath.    Cardiovascular:  Negative for chest pain, palpitations and leg swelling.   Gastrointestinal:  Negative for blood in stool, constipation, diarrhea, nausea and vomiting.   Genitourinary:  Negative for dysuria and hematuria.   Musculoskeletal:  Negative for myalgias.   Skin:         Hair loss - improving   Neurological:  Negative for dizziness and headaches.   All other systems reviewed and are negative.      OBJECTIVE     /74 (BP Site: Left Upper Arm, Patient Position: Sitting, BP Cuff Size: Medium Adult)   Pulse 80   Temp 97.7 °F (36.5 °C) (Oral)   Resp 16   Ht 1.727 m (5' 8\")   Wt 92.1 kg (203 lb)   BMI 30.87 kg/m²   Wt Readings from Last 3

## 2025-05-14 ENCOUNTER — HOSPITAL ENCOUNTER (OUTPATIENT)
Dept: WOMENS IMAGING | Age: 50
Discharge: HOME | End: 2025-05-14
Attending: RADIOLOGY
Payer: COMMERCIAL

## 2025-05-14 VITALS — WEIGHT: 203.04 LBS | BODY MASS INDEX: 30.77 KG/M2 | HEIGHT: 68 IN

## 2025-05-14 DIAGNOSIS — R92.30 BREAST DENSITY: ICD-10-CM

## 2025-05-14 DIAGNOSIS — Z09 FOLLOW-UP EXAM: ICD-10-CM

## 2025-05-14 DIAGNOSIS — R92.8 ABNORMAL MAMMOGRAM: ICD-10-CM

## 2025-05-14 PROCEDURE — G0279 TOMOSYNTHESIS, MAMMO: HCPCS

## 2025-07-04 DIAGNOSIS — E11.9 TYPE 2 DIABETES MELLITUS WITHOUT COMPLICATION, WITHOUT LONG-TERM CURRENT USE OF INSULIN (HCC): ICD-10-CM

## 2025-07-07 RX ORDER — TIRZEPATIDE 12.5 MG/.5ML
INJECTION, SOLUTION SUBCUTANEOUS
Qty: 2 ML | Refills: 2 | Status: SHIPPED | OUTPATIENT
Start: 2025-07-07

## 2025-07-07 NOTE — TELEPHONE ENCOUNTER
This medication refill is regarding a electronic request. Refill requested by [address]    Requested Prescriptions     Pending Prescriptions Disp Refills    MOUNJARO 12.5 MG/0.5ML SOAJ injection [Pharmacy Med Name: MOUNJARO 12.5 MG/0.5 ML PEN]  2     Sig: INJECT 12.5 MG INTO THE SKIN EVERY 7 DAYS     Date of last visit: 4/24/2025   Date of next visit: 10/29/2025  Date of last refill: 4/18/25 #2mL/2    Last Hemoglobin A1C:    Lab Results   Component Value Date/Time    LABA1C 4.8 04/24/2025 03:10 PM     Rx verified, ordered and set to EP.

## 2025-07-28 RX ORDER — ESCITALOPRAM OXALATE 20 MG/1
20 TABLET ORAL DAILY
Qty: 90 TABLET | Refills: 3 | Status: SHIPPED | OUTPATIENT
Start: 2025-07-28

## 2025-07-28 NOTE — TELEPHONE ENCOUNTER
This medication refill is regarding a electronic request. Refill requested by Pharmacy.    Requested Prescriptions     Pending Prescriptions Disp Refills    escitalopram (LEXAPRO) 20 MG tablet [Pharmacy Med Name: ESCITALOPRAM OXALATE 20MG TABS] 90 tablet 3     Sig: TAKE ONE TABLET BY MOUTH EVERY DAY     Date of last visit: 4/24/2025   Date of next visit: 10/29/2025  Date of last refill: 1/22/2025    #90/3   Pharmacy Name: [facility]     Last Lipid Panel:    Lab Results   Component Value Date/Time    CHOL 136 01/17/2025 08:15 AM    TRIG 38 01/17/2025 08:15 AM    HDL 57 01/17/2025 08:15 AM     Last CMP:   Lab Results   Component Value Date     01/17/2025    K 4.4 01/17/2025     01/17/2025    CO2 26 01/17/2025    BUN 15 01/17/2025    CREATININE 0.6 01/17/2025    GLUCOSE 78 01/17/2025    CALCIUM 9.2 01/17/2025    BILITOT 0.5 01/17/2025    ALKPHOS 87 01/17/2025    AST 15 01/17/2025    ALT 13 01/17/2025    LABGLOM > 90 01/17/2025       Last Thyroid:    Lab Results   Component Value Date    TSH 1.460 01/17/2025    T4FREE 1.00 01/17/2025     Last Hemoglobin A1C:    Lab Results   Component Value Date/Time    LABA1C 4.8 04/24/2025 03:10 PM       Rx verified, ordered and set to EP.

## 2025-08-07 ENCOUNTER — PATIENT MESSAGE (OUTPATIENT)
Dept: FAMILY MEDICINE CLINIC | Age: 50
End: 2025-08-07

## 2025-08-07 RX ORDER — FLUCONAZOLE 150 MG/1
150 TABLET ORAL
Qty: 2 TABLET | Refills: 0 | Status: SHIPPED | OUTPATIENT
Start: 2025-08-07 | End: 2025-08-13